# Patient Record
Sex: FEMALE | Race: BLACK OR AFRICAN AMERICAN | NOT HISPANIC OR LATINO | Employment: FULL TIME | URBAN - NONMETROPOLITAN AREA
[De-identification: names, ages, dates, MRNs, and addresses within clinical notes are randomized per-mention and may not be internally consistent; named-entity substitution may affect disease eponyms.]

---

## 2018-08-01 LAB — TSH SERPL DL<=0.05 MIU/L-ACNC: 1.73 UIU/ML (ref 0.27–4.2)

## 2020-09-26 ENCOUNTER — NURSE TRIAGE (OUTPATIENT)
Dept: CALL CENTER | Facility: HOSPITAL | Age: 35
End: 2020-09-26

## 2020-09-26 NOTE — TELEPHONE ENCOUNTER
Referred to urgent care for diagnosis.  Explained the implications of diagnosing an amoxil allergy and lifelong avoidance of that class of drug, versus if this is scarlet chula and just needs to finish the round of amoxil and will get better, versus a harmless amoxil rash. None of these can be diagnosed over the phone.     Reason for Disposition  • Taking new prescription antibiotic (Exception: finished taking new prescription antibiotic)    Additional Information  • Negative: [1] Life-threatening reaction (anaphylaxis) in the past to the same drug AND [2] < 2 hours since exposure  • Negative: Difficulty breathing or wheezing  • Negative: [1] Hoarseness or cough AND [2] started soon after 1st dose of drug  • Negative: [1] Swollen tongue AND [2] started soon after 1st dose of drug  • Negative: [1] Purple or blood-colored rash (spots or dots) AND [2] fever  • Negative: Sounds like a life-threatening emergency to the triager  • Negative: Rash is only on 1 part of the body (localized)  • Negative: Taking new non-prescription (OTC) antihistamine, decongestant, ear drops, eye drops, or other OTC cough/cold medicine  • Negative: Taking new prescription antihistamine, allergy medicine, asthma medicine, eye drops, ear drops or nose drops  • Negative: Rash started more than 3 days after stopping new prescription medicine  • Negative: Swollen tongue  • Negative: [1] Widespread hives AND [2] onset < 2 hours of exposure to 1st dose of drug  • Negative: Fever  • Negative: Patient sounds very sick or weak to the triager  • Negative: [1] Purple or blood-colored rash (spots or dots) AND [2] no fever AND [3] sounds well to triager  • Negative: [1] Taking new prescription medication AND [2] rash within 4 hours of 1st dose  • Negative: Large or small blisters on skin (i.e., fluid filled bubbles or sacs)  • Negative: Bloody crusts on lips or sores in mouth  • Negative: Face or lip swelling  • Negative: Hives or itching    Answer  "Assessment - Initial Assessment Questions  1. APPEARANCE of RASH: \"Describe the rash.\" (e.g., spots, blisters, raised areas, skin peeling, scaly)      Mosquito bite appearance, some cluster together more  2. SIZE: \"How big are the spots?\" (e.g., tip of pen, eraser, coin; inches, centimeters)      See above  3. LOCATION: \"Where is the rash located?\"      Arms  4. COLOR: \"What color is the rash?\" (Note: It is difficult to assess rash color in people with darker-colored skin. When this situation occurs, simply ask the caller to describe what they see.)      Pink  5. ONSET: \"When did the rash begin?\"      Today just began  6. FEVER: \"Do you have a fever?\" If so, ask: \"What is your temperature, how was it measured, and when did it start?\"      Afebrile  7. ITCHING: \"Does the rash itch?\" If so, ask: \"How bad is the itch?\" (Scale 1-10; or mild, moderate, severe)     Yes itching  8. CAUSE: \"What do you think is causing the rash?\"      Started amoxil 9/23  9. NEW MEDICATION: \"What new medication are you taking?\" (e.g., name of antibiotic) \"When did you start taking this medication?\".      amoxil  10. OTHER SYMPTOMS: \"Do you have any other symptoms?\" (e.g., sore throat, fever, joint pain)        For diagnosis strep throat.  Was see by provider, no throat swab done.    11. PREGNANCY: \"Is there any chance you are pregnant?\" \"When was your last menstrual period?\"        NA    Protocols used: RASH - WIDESPREAD ON DRUGS-ADULT-AH      "

## 2021-07-03 ENCOUNTER — HOSPITAL ENCOUNTER (OUTPATIENT)
Dept: GENERAL RADIOLOGY | Facility: HOSPITAL | Age: 36
Discharge: HOME | End: 2021-07-03
Admitting: NURSE PRACTITIONER

## 2021-07-03 PROCEDURE — 87635 SARS-COV-2 COVID-19 AMP PRB: CPT | Performed by: NURSE PRACTITIONER

## 2021-07-03 PROCEDURE — 71046 X-RAY EXAM CHEST 2 VIEWS: CPT

## 2021-07-06 ENCOUNTER — APPOINTMENT (OUTPATIENT)
Dept: GENERAL RADIOLOGY | Facility: HOSPITAL | Age: 36
End: 2021-07-06

## 2021-07-06 ENCOUNTER — NURSE TRIAGE (OUTPATIENT)
Dept: CALL CENTER | Facility: HOSPITAL | Age: 36
End: 2021-07-06

## 2021-07-06 ENCOUNTER — HOSPITAL ENCOUNTER (EMERGENCY)
Facility: HOSPITAL | Age: 36
Discharge: HOME | End: 2021-07-07
Attending: EMERGENCY MEDICINE | Admitting: EMERGENCY MEDICINE

## 2021-07-06 ENCOUNTER — APPOINTMENT (OUTPATIENT)
Dept: CT IMAGING | Facility: HOSPITAL | Age: 36
End: 2021-07-06

## 2021-07-06 DIAGNOSIS — U07.1 COVID-19: Primary | ICD-10-CM

## 2021-07-06 DIAGNOSIS — E86.0 DEHYDRATION: ICD-10-CM

## 2021-07-06 LAB
ALBUMIN SERPL-MCNC: 4.5 G/DL (ref 3.5–5.2)
ALBUMIN/GLOB SERPL: 1 G/DL
ALP SERPL-CCNC: 51 U/L (ref 39–117)
ALT SERPL W P-5'-P-CCNC: 34 U/L (ref 1–33)
ANION GAP SERPL CALCULATED.3IONS-SCNC: 15 MMOL/L (ref 5–15)
AST SERPL-CCNC: 32 U/L (ref 1–32)
B-HCG UR QL: NEGATIVE
BACTERIA UR QL AUTO: ABNORMAL /HPF
BASOPHILS # BLD AUTO: 0.03 10*3/MM3 (ref 0–0.2)
BASOPHILS NFR BLD AUTO: 0.4 % (ref 0–1.5)
BILIRUB SERPL-MCNC: 0.3 MG/DL (ref 0–1.2)
BILIRUB UR QL STRIP: NEGATIVE
BUN SERPL-MCNC: 14 MG/DL (ref 6–20)
BUN/CREAT SERPL: 9.5 (ref 7–25)
CALCIUM SPEC-SCNC: 9.8 MG/DL (ref 8.6–10.5)
CHLORIDE SERPL-SCNC: 99 MMOL/L (ref 98–107)
CLARITY UR: ABNORMAL
CO2 SERPL-SCNC: 20 MMOL/L (ref 22–29)
COLOR UR: YELLOW
CREAT SERPL-MCNC: 1.48 MG/DL (ref 0.57–1)
D DIMER PPP FEU-MCNC: 0.53 MG/L (FEU) (ref 0–0.5)
DEPRECATED RDW RBC AUTO: 40.1 FL (ref 37–54)
EOSINOPHIL # BLD AUTO: 0 10*3/MM3 (ref 0–0.4)
EOSINOPHIL NFR BLD AUTO: 0 % (ref 0.3–6.2)
ERYTHROCYTE [DISTWIDTH] IN BLOOD BY AUTOMATED COUNT: 14.2 % (ref 12.3–15.4)
GFR SERPL CREATININE-BSD FRML MDRD: 49 ML/MIN/1.73
GLOBULIN UR ELPH-MCNC: 4.6 GM/DL
GLUCOSE SERPL-MCNC: 90 MG/DL (ref 65–99)
GLUCOSE UR STRIP-MCNC: NEGATIVE MG/DL
HCT VFR BLD AUTO: 45.5 % (ref 34–46.6)
HGB BLD-MCNC: 16.1 G/DL (ref 12–15.9)
HGB UR QL STRIP.AUTO: ABNORMAL
HYALINE CASTS UR QL AUTO: ABNORMAL /LPF
IMM GRANULOCYTES # BLD AUTO: 0.03 10*3/MM3 (ref 0–0.05)
IMM GRANULOCYTES NFR BLD AUTO: 0.4 % (ref 0–0.5)
INTERNAL NEGATIVE CONTROL: NORMAL
INTERNAL POSITIVE CONTROL: NORMAL
KETONES UR QL STRIP: ABNORMAL
LEUKOCYTE ESTERASE UR QL STRIP.AUTO: NEGATIVE
LIPASE SERPL-CCNC: 57 U/L (ref 13–60)
LYMPHOCYTES # BLD AUTO: 1.83 10*3/MM3 (ref 0.7–3.1)
LYMPHOCYTES NFR BLD AUTO: 25.6 % (ref 19.6–45.3)
Lab: NORMAL
MCH RBC QN AUTO: 27.8 PG (ref 26.6–33)
MCHC RBC AUTO-ENTMCNC: 35.4 G/DL (ref 31.5–35.7)
MCV RBC AUTO: 78.6 FL (ref 79–97)
MONOCYTES # BLD AUTO: 0.5 10*3/MM3 (ref 0.1–0.9)
MONOCYTES NFR BLD AUTO: 7 % (ref 5–12)
NEUTROPHILS NFR BLD AUTO: 4.76 10*3/MM3 (ref 1.7–7)
NEUTROPHILS NFR BLD AUTO: 66.6 % (ref 42.7–76)
NITRITE UR QL STRIP: NEGATIVE
NRBC BLD AUTO-RTO: 0 /100 WBC (ref 0–0.2)
PH UR STRIP.AUTO: 6.5 [PH] (ref 5–8)
PLATELET # BLD AUTO: 249 10*3/MM3 (ref 140–450)
PMV BLD AUTO: 9.2 FL (ref 6–12)
POTASSIUM SERPL-SCNC: 3.5 MMOL/L (ref 3.5–5.2)
PROT SERPL-MCNC: 9.1 G/DL (ref 6–8.5)
PROT UR QL STRIP: ABNORMAL
RBC # BLD AUTO: 5.79 10*6/MM3 (ref 3.77–5.28)
RBC # UR: ABNORMAL /HPF
REF LAB TEST METHOD: ABNORMAL
SODIUM SERPL-SCNC: 134 MMOL/L (ref 136–145)
SP GR UR STRIP: 1.01 (ref 1–1.03)
SQUAMOUS #/AREA URNS HPF: ABNORMAL /HPF
UROBILINOGEN UR QL STRIP: ABNORMAL
WBC # BLD AUTO: 7.15 10*3/MM3 (ref 3.4–10.8)
WBC UR QL AUTO: ABNORMAL /HPF

## 2021-07-06 PROCEDURE — 96374 THER/PROPH/DIAG INJ IV PUSH: CPT

## 2021-07-06 PROCEDURE — 87040 BLOOD CULTURE FOR BACTERIA: CPT | Performed by: EMERGENCY MEDICINE

## 2021-07-06 PROCEDURE — 93010 ELECTROCARDIOGRAM REPORT: CPT | Performed by: INTERNAL MEDICINE

## 2021-07-06 PROCEDURE — 85379 FIBRIN DEGRADATION QUANT: CPT | Performed by: EMERGENCY MEDICINE

## 2021-07-06 PROCEDURE — 81025 URINE PREGNANCY TEST: CPT | Performed by: EMERGENCY MEDICINE

## 2021-07-06 PROCEDURE — 85025 COMPLETE CBC W/AUTO DIFF WBC: CPT | Performed by: EMERGENCY MEDICINE

## 2021-07-06 PROCEDURE — 71045 X-RAY EXAM CHEST 1 VIEW: CPT

## 2021-07-06 PROCEDURE — 81001 URINALYSIS AUTO W/SCOPE: CPT | Performed by: EMERGENCY MEDICINE

## 2021-07-06 PROCEDURE — 83690 ASSAY OF LIPASE: CPT | Performed by: EMERGENCY MEDICINE

## 2021-07-06 PROCEDURE — 71275 CT ANGIOGRAPHY CHEST: CPT

## 2021-07-06 PROCEDURE — 99284 EMERGENCY DEPT VISIT MOD MDM: CPT

## 2021-07-06 PROCEDURE — 25010000002 DEXAMETHASONE PER 1 MG: Performed by: EMERGENCY MEDICINE

## 2021-07-06 PROCEDURE — 25010000002 ONDANSETRON PER 1 MG: Performed by: EMERGENCY MEDICINE

## 2021-07-06 PROCEDURE — 93005 ELECTROCARDIOGRAM TRACING: CPT | Performed by: EMERGENCY MEDICINE

## 2021-07-06 PROCEDURE — 96375 TX/PRO/DX INJ NEW DRUG ADDON: CPT

## 2021-07-06 PROCEDURE — 0 IOPAMIDOL PER 1 ML: Performed by: EMERGENCY MEDICINE

## 2021-07-06 PROCEDURE — 80053 COMPREHEN METABOLIC PANEL: CPT | Performed by: EMERGENCY MEDICINE

## 2021-07-06 RX ORDER — DEXAMETHASONE SODIUM PHOSPHATE 10 MG/ML
10 INJECTION INTRAMUSCULAR; INTRAVENOUS ONCE
Status: COMPLETED | OUTPATIENT
Start: 2021-07-06 | End: 2021-07-06

## 2021-07-06 RX ORDER — ONDANSETRON 2 MG/ML
4 INJECTION INTRAMUSCULAR; INTRAVENOUS ONCE
Status: COMPLETED | OUTPATIENT
Start: 2021-07-06 | End: 2021-07-06

## 2021-07-06 RX ADMIN — DEXAMETHASONE SODIUM PHOSPHATE 10 MG: 10 INJECTION INTRAMUSCULAR; INTRAVENOUS at 20:12

## 2021-07-06 RX ADMIN — ONDANSETRON 4 MG: 2 INJECTION INTRAMUSCULAR; INTRAVENOUS at 20:11

## 2021-07-06 RX ADMIN — SODIUM CHLORIDE, POTASSIUM CHLORIDE, SODIUM LACTATE AND CALCIUM CHLORIDE 1000 ML: 600; 310; 30; 20 INJECTION, SOLUTION INTRAVENOUS at 20:09

## 2021-07-06 RX ADMIN — IOPAMIDOL 72 ML: 755 INJECTION, SOLUTION INTRAVENOUS at 23:15

## 2021-07-06 NOTE — TELEPHONE ENCOUNTER
States she found out Saturday that she tested positive for covid. States she has been eating soup but she is nauseated and vomiting. States she feels bad.     Denies any shortness of breath. Last urinated in the last few minutes. States she has had lightheadedness. States she has had some diarrhea.     Encouraged to call PCP for nausea medication this morning.     Reason for Disposition  • [1] COVID-19 diagnosed AND [2] has mild nausea, vomiting or diarrhea    Additional Information  • Negative: SEVERE difficulty breathing (e.g., struggling for each breath, speaks in single words)  • Negative: Difficult to awaken or acting confused (e.g., disoriented, slurred speech)  • Negative: Bluish (or gray) lips or face now  • Negative: Shock suspected (e.g., cold/pale/clammy skin, too weak to stand, low BP, rapid pulse)  • Negative: Sounds like a life-threatening emergency to the triager  • Negative: [1] COVID-19 exposure AND [2] has not completed COVID-19 vaccine series AND [3] no symptoms  • Negative: [1] COVID-19 exposure AND [2] completed COVID-19 vaccine series (fully vaccinated) AND [3] no symptoms  • Negative: COVID-19 vaccine reaction suspected (e.g., fever, headache, muscle aches) occurring during days 1-3 after getting vaccine  • Negative: COVID-19 vaccine, questions about  • Negative: [1] COVID-19 vaccine series completed (fully vaccinated) AND [2] new-onset of COVID-19 symptoms BUT [3] no known exposure  • Negative: [1] Had lab test confirmed COVID-19 infection within last 3 months AND [2] new-onset of COVID-19 symptoms BUT [3] no known exposure  • Negative: [1] Lives with someone known to have influenza (flu test positive) AND [2] flu-like symptoms (e.g., cough, runny nose, sore throat, SOB; with or without fever)  • Negative: [1] Adult with possible COVID-19 symptoms AND [2] triager concerned about severity of symptoms or other causes  • Negative: COVID-19 and breastfeeding, questions about  • Negative: SEVERE  or constant chest pain or pressure (Exception: mild central chest pain, present only when coughing)  • Negative: MODERATE difficulty breathing (e.g., speaks in phrases, SOB even at rest, pulse 100-120)  • Negative: [1] Headache AND [2] stiff neck (can't touch chin to chest)  • Negative: MILD difficulty breathing (e.g., minimal/no SOB at rest, SOB with walking, pulse <100)  • Negative: Chest pain or pressure  • Negative: Patient sounds very sick or weak to the triager  • Negative: Fever > 103 F (39.4 C)  • Negative: [1] Fever > 101 F (38.3 C) AND [2] age > 60 years  • Negative: [1] Fever > 100.0 F (37.8 C) AND [2] bedridden (e.g., nursing home patient, CVA, chronic illness, recovering from surgery)  • Negative: [1] HIGH RISK patient (e.g., age > 64 years, diabetes, heart or lung disease, weak immune system) AND [2] new or worsening symptoms  • Negative: [1] HIGH RISK patient AND [2] influenza is widespread in the community AND [3] ONE OR MORE respiratory symptoms: cough, sore throat, runny or stuffy nose  • Negative: [1] HIGH RISK patient AND [2] influenza exposure within the last 7 days AND [3] ONE OR MORE respiratory symptoms: cough, sore throat, runny or stuffy nose  • Negative: Fever present > 3 days (72 hours)  • Negative: [1] Fever returns after gone for over 24 hours AND [2] symptoms worse or not improved  • Negative: [1] Continuous (nonstop) coughing interferes with work or school AND [2] no improvement using cough treatment per protocol  • Negative: [1] COVID-19 infection suspected by caller or triager AND [2] mild symptoms (cough, fever, or others) AND [3] no complications or SOB  • Negative: Cough present > 3 weeks  • Negative: [1] COVID-19 diagnosed by positive lab test AND [2] NO symptoms (e.g., cough, fever, others)  • Negative: [1] COVID-19 diagnosed by positive lab test AND [2] mild symptoms (e.g., cough, fever, others) AND [3] no complications or SOB  • Negative: [1] COVID-19 diagnosed by HCP  "(doctor, NP or PA) AND [2] mild symptoms (e.g., cough, fever, others) AND [3] no complications or SOB    Answer Assessment - Initial Assessment Questions  1. COVID-19 DIAGNOSIS: \"Who made your Coronavirus (COVID-19) diagnosis?\" \"Was it confirmed by a positive lab test?\" If not diagnosed by a HCP, ask \"Are there lots of cases (community spread) where you live?\" (See public health department website, if unsure)      See note  2. COVID-19 EXPOSURE: \"Was there any known exposure to COVID before the symptoms began?\" CDC Definition of close contact: within 6 feet (2 meters) for a total of 15 minutes or more over a 24-hour period.       See note  3. ONSET: \"When did the COVID-19 symptoms start?\"       See note  4. WORST SYMPTOM: \"What is your worst symptom?\" (e.g., cough, fever, shortness of breath, muscle aches)      See note  5. COUGH: \"Do you have a cough?\" If Yes, ask: \"How bad is the cough?\"        See note  6. FEVER: \"Do you have a fever?\" If Yes, ask: \"What is your temperature, how was it measured, and when did it start?\"      See note  7. RESPIRATORY STATUS: \"Describe your breathing?\" (e.g., shortness of breath, wheezing, unable to speak)       See note  8. BETTER-SAME-WORSE: \"Are you getting better, staying the same or getting worse compared to yesterday?\"  If getting worse, ask, \"In what way?\"      See note  9. HIGH RISK DISEASE: \"Do you have any chronic medical problems?\" (e.g., asthma, heart or lung disease, weak immune system, obesity, etc.)      See note  10. PREGNANCY: \"Is there any chance you are pregnant?\" \"When was your last menstrual period?\"        See note  11. OTHER SYMPTOMS: \"Do you have any other symptoms?\"  (e.g., chills, fatigue, headache, loss of smell or taste, muscle pain, sore throat; new loss of smell or taste especially support the diagnosis of COVID-19)        See note    Protocols used: CORONAVIRUS (COVID-19) DIAGNOSED OR SUSPECTED-ADULT-AH      "

## 2021-07-07 VITALS
SYSTOLIC BLOOD PRESSURE: 109 MMHG | HEART RATE: 102 BPM | HEIGHT: 60 IN | WEIGHT: 158 LBS | DIASTOLIC BLOOD PRESSURE: 84 MMHG | BODY MASS INDEX: 31.02 KG/M2 | RESPIRATION RATE: 19 BRPM | OXYGEN SATURATION: 100 % | TEMPERATURE: 98.9 F

## 2021-07-07 PROCEDURE — 25010000002 ONDANSETRON PER 1 MG: Performed by: EMERGENCY MEDICINE

## 2021-07-07 PROCEDURE — 96376 TX/PRO/DX INJ SAME DRUG ADON: CPT

## 2021-07-07 RX ORDER — PROMETHAZINE HYDROCHLORIDE 25 MG/1
25 TABLET ORAL EVERY 6 HOURS PRN
Qty: 15 TABLET | Refills: 0 | Status: SHIPPED | OUTPATIENT
Start: 2021-07-07 | End: 2021-07-07 | Stop reason: SDUPTHER

## 2021-07-07 RX ORDER — DEXAMETHASONE 1 MG
1 TABLET ORAL DAILY
Qty: 6 TABLET | Refills: 0 | Status: ON HOLD | OUTPATIENT
Start: 2021-07-07 | End: 2021-07-12

## 2021-07-07 RX ORDER — DEXAMETHASONE 1 MG
1 TABLET ORAL DAILY
Qty: 6 TABLET | Refills: 0 | Status: SHIPPED | OUTPATIENT
Start: 2021-07-07 | End: 2021-07-07 | Stop reason: SDUPTHER

## 2021-07-07 RX ORDER — PROMETHAZINE HYDROCHLORIDE 25 MG/1
25 TABLET ORAL EVERY 6 HOURS PRN
Qty: 15 TABLET | Refills: 0 | Status: SHIPPED | OUTPATIENT
Start: 2021-07-07

## 2021-07-07 RX ORDER — BENZONATATE 200 MG/1
200 CAPSULE ORAL 3 TIMES DAILY PRN
Qty: 8 CAPSULE | Refills: 0 | Status: ON HOLD | OUTPATIENT
Start: 2021-07-07 | End: 2021-07-15 | Stop reason: SDUPTHER

## 2021-07-07 RX ORDER — ONDANSETRON 2 MG/ML
4 INJECTION INTRAMUSCULAR; INTRAVENOUS ONCE
Status: COMPLETED | OUTPATIENT
Start: 2021-07-07 | End: 2021-07-07

## 2021-07-07 RX ORDER — BENZONATATE 200 MG/1
200 CAPSULE ORAL 3 TIMES DAILY PRN
Qty: 8 CAPSULE | Refills: 0 | Status: SHIPPED | OUTPATIENT
Start: 2021-07-07 | End: 2021-07-07 | Stop reason: SDUPTHER

## 2021-07-07 RX ADMIN — ONDANSETRON 4 MG: 2 INJECTION INTRAMUSCULAR; INTRAVENOUS at 00:34

## 2021-07-07 NOTE — ED PROVIDER NOTES
"Subjective   36 y/o female diagnosed with COVID on 7/3 arrives now for \"feeling bad\" She notes nausea, vomiting, cough, fevers and body aches. She is taking azithromycin but is not taking any other medications. She denies any pain, provoking/alleviating factors. She is not vaccinated against COVID. She denies falls, trauma, flank or back pain, dysuria, hematuria or other issues. She arrives in NAD.           Review of Systems   All other systems reviewed and are negative.      No past medical history on file.    No Known Allergies    Past Surgical History:   Procedure Laterality Date   •  SECTION         No family history on file.    Social History     Socioeconomic History   • Marital status: Single     Spouse name: Not on file   • Number of children: Not on file   • Years of education: Not on file   • Highest education level: Not on file   Tobacco Use   • Smoking status: Former Smoker     Packs/day: 0.25     Types: Cigarettes   • Smokeless tobacco: Never Used   Substance and Sexual Activity   • Alcohol use: Yes     Comment: one glass   • Drug use: Never           Objective   Physical Exam  Vitals and nursing note reviewed.   Constitutional:       Appearance: Normal appearance. She is normal weight.   HENT:      Head: Normocephalic and atraumatic.      Right Ear: External ear normal.      Left Ear: External ear normal.      Nose: Nose normal.      Mouth/Throat:      Mouth: Mucous membranes are moist.      Pharynx: Oropharynx is clear.   Eyes:      Conjunctiva/sclera: Conjunctivae normal.      Pupils: Pupils are equal, round, and reactive to light.   Cardiovascular:      Rate and Rhythm: Regular rhythm. Tachycardia present.      Pulses: Normal pulses.      Heart sounds: Normal heart sounds.   Pulmonary:      Effort: Pulmonary effort is normal. No respiratory distress.      Breath sounds: Normal breath sounds. No stridor. No wheezing or rhonchi.   Abdominal:      General: Abdomen is flat. Bowel sounds are " normal. There is no distension.      Palpations: There is no mass.      Tenderness: There is no abdominal tenderness.   Musculoskeletal:         General: No swelling or tenderness. Normal range of motion.      Cervical back: Normal range of motion.   Skin:     General: Skin is warm.      Capillary Refill: Capillary refill takes less than 2 seconds.   Neurological:      General: No focal deficit present.      Mental Status: She is alert and oriented to person, place, and time. Mental status is at baseline.      Cranial Nerves: No cranial nerve deficit.   Psychiatric:         Mood and Affect: Mood normal.         Behavior: Behavior normal.         Thought Content: Thought content normal.         Procedures           ED Course  ED Course as of Jul 07 0017 Wed Jul 07, 2021   0012 Her CTA was without acute findings.     I explained to the patient that her studies looked okay. Maybe a mild dehydration but otherwise no acute findings. No further vomiting here. She does not meet criteria for admission nor for MAB given normal vs and young age without risk factors. At this time we will dc to home.     [JH]      ED Course User Index  [JH] Anderson Granda MD            XR Chest 1 View   Final Result   1. No acute cardiopulmonary findings.   This report was finalized on 07/06/2021 20:01 by Dr Pebbles Sprague MD.      CT Angiogram Chest    (Results Pending)     Labs Reviewed   COMPREHENSIVE METABOLIC PANEL - Abnormal; Notable for the following components:       Result Value    Creatinine 1.48 (*)     Sodium 134 (*)     CO2 20.0 (*)     Total Protein 9.1 (*)     ALT (SGPT) 34 (*)     eGFR   Amer 49 (*)     All other components within normal limits    Narrative:     GFR Normal >60  Chronic Kidney Disease <60  Kidney Failure <15     D-DIMER, QUANTITATIVE - Abnormal; Notable for the following components:    D-Dimer, Quantitative 0.53 (*)     All other components within normal limits    Narrative:     Reference Range  is 0-0.50 mg/L FEU. However, results <0.50 mg/L FEU tends to rule out DVT or PE. Results >0.50 mg/L FEU are not useful in predicting absence or presence of DVT or PE.     URINALYSIS W/ CULTURE IF INDICATED - Abnormal; Notable for the following components:    Appearance, UA Cloudy (*)     Ketones, UA Trace (*)     Blood, UA Moderate (2+) (*)     Protein, UA Trace (*)     All other components within normal limits   CBC WITH AUTO DIFFERENTIAL - Abnormal; Notable for the following components:    RBC 5.79 (*)     Hemoglobin 16.1 (*)     MCV 78.6 (*)     Eosinophil % 0.0 (*)     All other components within normal limits   URINALYSIS, MICROSCOPIC ONLY - Abnormal; Notable for the following components:    RBC, UA 6-12 (*)     WBC, UA 3-5 (*)     Bacteria, UA 1+ (*)     Squamous Epithelial Cells, UA 7-12 (*)     All other components within normal limits   LIPASE - Normal   POCT PEFORM URINE PREGNANCY - Normal   BLOOD CULTURE   BLOOD CULTURE   CBC AND DIFFERENTIAL    Narrative:     The following orders were created for panel order CBC & Differential.  Procedure                               Abnormality         Status                     ---------                               -----------         ------                     CBC Auto Differential[[id number]]        Abnormal            Final result                 Please view results for these tests on the individual orders.                                       MDM    Final diagnoses:   COVID-19   Dehydration       ED Disposition  ED Disposition     ED Disposition Condition Comment    Discharge Stable           Omar Muhammad MD  [address] [place] [address] [address]  [facility] [address] [phone]               Medication List      New Prescriptions    benzonatate 200 MG capsule  Commonly known as: TESSALON  Take 1 capsule by mouth 3 (Three) Times a Day As Needed for Cough.     dexamethasone 1 MG tablet  Commonly known as: DECADRON  Take 1 tablet by mouth Daily.     promethazine 25  MG tablet  Commonly known as: PHENERGAN  Take 1 tablet by mouth Every 6 (Six) Hours As Needed for Nausea or Vomiting.           Where to Get Your Medications      These medications were sent to [facility]/pharmacy #[id number] - [place], [place] - [address] AT [address]CROSS FROM [address] - [phone] [facility] [phone]   [address] [place] [address]    Hours: 24-hours Phone: [phone]   · benzonatate 200 MG capsule  · dexamethasone 1 MG tablet  · promethazine 25 MG tablet          Anderson Granda MD  07/07/21 0017       Anderson Granda MD  07/07/21 0017       Anderson Granda MD  07/07/21 0017

## 2021-07-08 LAB
QT INTERVAL: 332 MS
QTC INTERVAL: 469 MS

## 2021-07-09 ENCOUNTER — PATIENT OUTREACH (OUTPATIENT)
Dept: CASE MANAGEMENT | Facility: OTHER | Age: 36
End: 2021-07-09

## 2021-07-09 NOTE — OUTREACH NOTE
Ambulatory Case Management Note    ED Potential Covid Discharge Follow-up    Patient seen at [facility] ED 7.6.21 for COVID-19 and dehydration. She is consuming fluids but her appetite is minimal;smell and taste remain intact. Denied SOB, N/V, body aches, or fever. She will contact PCP for any further needs. She filled the Decadron prescribed in ED. MyChart is active, discussed 24/7 nurse triage, and ACM contact information. She denied food or medication insecurities. Family/friends are assisting with supplies. She understands her 10 day quarantine.     Anita Adam RN  Ambulatory Case Management    7/9/2021, 11:32 CDT

## 2021-07-11 ENCOUNTER — HOSPITAL ENCOUNTER (INPATIENT)
Facility: HOSPITAL | Age: 36
LOS: 4 days | Discharge: HOME | End: 2021-07-15
Attending: EMERGENCY MEDICINE | Admitting: INTERNAL MEDICINE

## 2021-07-11 ENCOUNTER — APPOINTMENT (OUTPATIENT)
Dept: GENERAL RADIOLOGY | Facility: HOSPITAL | Age: 36
End: 2021-07-11

## 2021-07-11 ENCOUNTER — NURSE TRIAGE (OUTPATIENT)
Dept: CALL CENTER | Facility: HOSPITAL | Age: 36
End: 2021-07-11

## 2021-07-11 DIAGNOSIS — R65.10 SYSTEMIC INFLAMMATORY RESPONSE SYNDROME (HCC): Primary | ICD-10-CM

## 2021-07-11 DIAGNOSIS — U07.1 COVID-19: ICD-10-CM

## 2021-07-11 DIAGNOSIS — N28.9 RENAL INSUFFICIENCY: ICD-10-CM

## 2021-07-11 DIAGNOSIS — E87.29 HIGH ANION GAP METABOLIC ACIDOSIS: ICD-10-CM

## 2021-07-11 PROBLEM — J12.82 PNEUMONIA DUE TO COVID-19 VIRUS: Status: ACTIVE | Noted: 2021-07-11

## 2021-07-11 PROBLEM — R11.2 NAUSEA & VOMITING: Status: ACTIVE | Noted: 2021-07-11

## 2021-07-11 LAB
ABO GROUP BLD: NORMAL
ALBUMIN SERPL-MCNC: 4.2 G/DL (ref 3.5–5.2)
ALBUMIN/GLOB SERPL: 1.1 G/DL
ALP SERPL-CCNC: 37 U/L (ref 39–117)
ALT SERPL W P-5'-P-CCNC: 22 U/L (ref 1–33)
ANION GAP SERPL CALCULATED.3IONS-SCNC: 18 MMOL/L (ref 5–15)
ARTERIAL PATENCY WRIST A: POSITIVE
AST SERPL-CCNC: 32 U/L (ref 1–32)
ATMOSPHERIC PRESS: 746 MMHG
B PARAPERT DNA SPEC QL NAA+PROBE: NOT DETECTED
B PERT DNA SPEC QL NAA+PROBE: NOT DETECTED
BACTERIA SPEC AEROBE CULT: NORMAL
BACTERIA SPEC AEROBE CULT: NORMAL
BACTERIA UR QL AUTO: ABNORMAL /HPF
BASE EXCESS BLDA CALC-SCNC: -2.7 MMOL/L (ref 0–2)
BASOPHILS # BLD AUTO: 0.04 10*3/MM3 (ref 0–0.2)
BASOPHILS NFR BLD AUTO: 0.4 % (ref 0–1.5)
BDY SITE: ABNORMAL
BILIRUB SERPL-MCNC: 0.5 MG/DL (ref 0–1.2)
BILIRUB UR QL STRIP: NEGATIVE
BLD GP AB SCN SERPL QL: NEGATIVE
BODY TEMPERATURE: 37 C
BUN SERPL-MCNC: 19 MG/DL (ref 6–20)
BUN/CREAT SERPL: 15 (ref 7–25)
C PNEUM DNA NPH QL NAA+NON-PROBE: NOT DETECTED
CALCIUM SPEC-SCNC: 9 MG/DL (ref 8.6–10.5)
CHLORIDE SERPL-SCNC: 94 MMOL/L (ref 98–107)
CK SERPL-CCNC: 223 U/L (ref 20–180)
CLARITY UR: CLEAR
CO2 SERPL-SCNC: 17 MMOL/L (ref 22–29)
COLOR UR: YELLOW
CREAT SERPL-MCNC: 1.27 MG/DL (ref 0.57–1)
CRP SERPL-MCNC: 10.26 MG/DL (ref 0–0.5)
D DIMER PPP FEU-MCNC: 0.53 MG/L (FEU) (ref 0–0.5)
DEPRECATED RDW RBC AUTO: 37.4 FL (ref 37–54)
EOSINOPHIL # BLD AUTO: 0 10*3/MM3 (ref 0–0.4)
EOSINOPHIL NFR BLD AUTO: 0 % (ref 0.3–6.2)
ERYTHROCYTE [DISTWIDTH] IN BLOOD BY AUTOMATED COUNT: 13.7 % (ref 12.3–15.4)
FERRITIN SERPL-MCNC: 1450 NG/ML (ref 13–150)
FLUAV SUBTYP SPEC NAA+PROBE: NOT DETECTED
FLUBV RNA ISLT QL NAA+PROBE: NOT DETECTED
GFR SERPL CREATININE-BSD FRML MDRD: 58 ML/MIN/1.73
GLOBULIN UR ELPH-MCNC: 3.9 GM/DL
GLUCOSE SERPL-MCNC: 87 MG/DL (ref 65–99)
GLUCOSE UR STRIP-MCNC: NEGATIVE MG/DL
HADV DNA SPEC NAA+PROBE: NOT DETECTED
HCO3 BLDA-SCNC: 17.4 MMOL/L (ref 20–26)
HCOV 229E RNA SPEC QL NAA+PROBE: NOT DETECTED
HCOV HKU1 RNA SPEC QL NAA+PROBE: NOT DETECTED
HCOV NL63 RNA SPEC QL NAA+PROBE: NOT DETECTED
HCOV OC43 RNA SPEC QL NAA+PROBE: NOT DETECTED
HCT VFR BLD AUTO: 41.9 % (ref 34–46.6)
HGB BLD-MCNC: 15.3 G/DL (ref 12–15.9)
HGB UR QL STRIP.AUTO: ABNORMAL
HMPV RNA NPH QL NAA+NON-PROBE: NOT DETECTED
HPIV1 RNA SPEC QL NAA+PROBE: NOT DETECTED
HPIV2 RNA SPEC QL NAA+PROBE: NOT DETECTED
HPIV3 RNA NPH QL NAA+PROBE: NOT DETECTED
HPIV4 P GENE NPH QL NAA+PROBE: NOT DETECTED
HYALINE CASTS UR QL AUTO: ABNORMAL /LPF
IMM GRANULOCYTES # BLD AUTO: 0.14 10*3/MM3 (ref 0–0.05)
IMM GRANULOCYTES NFR BLD AUTO: 1.4 % (ref 0–0.5)
KETONES UR QL STRIP: NEGATIVE
L PNEUMO1 AG UR QL IA: NEGATIVE
LDH SERPL-CCNC: 384 U/L (ref 135–214)
LEUKOCYTE ESTERASE UR QL STRIP.AUTO: NEGATIVE
LIPASE SERPL-CCNC: 62 U/L (ref 13–60)
LYMPHOCYTES # BLD AUTO: 1 10*3/MM3 (ref 0.7–3.1)
LYMPHOCYTES NFR BLD AUTO: 10.2 % (ref 19.6–45.3)
Lab: ABNORMAL
M PNEUMO IGG SER IA-ACNC: NOT DETECTED
MAGNESIUM SERPL-MCNC: 2.2 MG/DL (ref 1.6–2.6)
MCH RBC QN AUTO: 27.8 PG (ref 26.6–33)
MCHC RBC AUTO-ENTMCNC: 36.5 G/DL (ref 31.5–35.7)
MCV RBC AUTO: 76.2 FL (ref 79–97)
MODALITY: ABNORMAL
MONOCYTES # BLD AUTO: 0.51 10*3/MM3 (ref 0.1–0.9)
MONOCYTES NFR BLD AUTO: 5.2 % (ref 5–12)
NEUTROPHILS NFR BLD AUTO: 8.11 10*3/MM3 (ref 1.7–7)
NEUTROPHILS NFR BLD AUTO: 82.8 % (ref 42.7–76)
NITRITE UR QL STRIP: NEGATIVE
NRBC BLD AUTO-RTO: 0 /100 WBC (ref 0–0.2)
NT-PROBNP SERPL-MCNC: <5 PG/ML (ref 0–450)
PCO2 BLDA: 20.4 MM HG (ref 35–45)
PCO2 TEMP ADJ BLD: 20.4 MM HG (ref 35–45)
PH BLDA: 7.54 PH UNITS (ref 7.35–7.45)
PH UR STRIP.AUTO: 7 [PH] (ref 5–8)
PH, TEMP CORRECTED: 7.54 PH UNITS (ref 7.35–7.45)
PHOSPHATE SERPL-MCNC: 2 MG/DL (ref 2.5–4.5)
PLATELET # BLD AUTO: 291 10*3/MM3 (ref 140–450)
PMV BLD AUTO: 9.7 FL (ref 6–12)
PO2 BLDA: 67.9 MM HG (ref 83–108)
PO2 TEMP ADJ BLD: 67.9 MM HG (ref 83–108)
POTASSIUM SERPL-SCNC: 3.2 MMOL/L (ref 3.5–5.2)
PROCALCITONIN SERPL-MCNC: 0.16 NG/ML (ref 0–0.25)
PROT SERPL-MCNC: 8.1 G/DL (ref 6–8.5)
PROT UR QL STRIP: NEGATIVE
QT INTERVAL: 356 MS
QTC INTERVAL: 537 MS
RBC # BLD AUTO: 5.5 10*6/MM3 (ref 3.77–5.28)
RBC # UR: ABNORMAL /HPF
REF LAB TEST METHOD: ABNORMAL
RH BLD: POSITIVE
RHINOVIRUS RNA SPEC NAA+PROBE: NOT DETECTED
RSV RNA NPH QL NAA+NON-PROBE: NOT DETECTED
S PNEUM AG SPEC QL LA: NEGATIVE
SAO2 % BLDCOA: 95.7 % (ref 94–99)
SODIUM SERPL-SCNC: 129 MMOL/L (ref 136–145)
SP GR UR STRIP: 1.01 (ref 1–1.03)
SQUAMOUS #/AREA URNS HPF: ABNORMAL /HPF
T&S EXPIRATION DATE: NORMAL
TROPONIN T SERPL-MCNC: <0.01 NG/ML (ref 0–0.03)
UROBILINOGEN UR QL STRIP: ABNORMAL
VENTILATOR MODE: ABNORMAL
WBC # BLD AUTO: 9.8 10*3/MM3 (ref 3.4–10.8)
WBC UR QL AUTO: ABNORMAL /HPF

## 2021-07-11 PROCEDURE — 25010000002 ONDANSETRON PER 1 MG: Performed by: FAMILY MEDICINE

## 2021-07-11 PROCEDURE — 71045 X-RAY EXAM CHEST 1 VIEW: CPT

## 2021-07-11 PROCEDURE — 82728 ASSAY OF FERRITIN: CPT | Performed by: FAMILY MEDICINE

## 2021-07-11 PROCEDURE — XW033E5 INTRODUCTION OF REMDESIVIR ANTI-INFECTIVE INTO PERIPHERAL VEIN, PERCUTANEOUS APPROACH, NEW TECHNOLOGY GROUP 5: ICD-10-PCS | Performed by: INTERNAL MEDICINE

## 2021-07-11 PROCEDURE — 86140 C-REACTIVE PROTEIN: CPT | Performed by: EMERGENCY MEDICINE

## 2021-07-11 PROCEDURE — 93010 ELECTROCARDIOGRAM REPORT: CPT | Performed by: INTERNAL MEDICINE

## 2021-07-11 PROCEDURE — 84145 PROCALCITONIN (PCT): CPT | Performed by: FAMILY MEDICINE

## 2021-07-11 PROCEDURE — 85379 FIBRIN DEGRADATION QUANT: CPT | Performed by: EMERGENCY MEDICINE

## 2021-07-11 PROCEDURE — 83690 ASSAY OF LIPASE: CPT | Performed by: EMERGENCY MEDICINE

## 2021-07-11 PROCEDURE — 84484 ASSAY OF TROPONIN QUANT: CPT | Performed by: EMERGENCY MEDICINE

## 2021-07-11 PROCEDURE — 80053 COMPREHEN METABOLIC PANEL: CPT | Performed by: EMERGENCY MEDICINE

## 2021-07-11 PROCEDURE — 82803 BLOOD GASES ANY COMBINATION: CPT

## 2021-07-11 PROCEDURE — 86900 BLOOD TYPING SEROLOGIC ABO: CPT | Performed by: EMERGENCY MEDICINE

## 2021-07-11 PROCEDURE — 84100 ASSAY OF PHOSPHORUS: CPT | Performed by: EMERGENCY MEDICINE

## 2021-07-11 PROCEDURE — 87040 BLOOD CULTURE FOR BACTERIA: CPT | Performed by: FAMILY MEDICINE

## 2021-07-11 PROCEDURE — 36600 WITHDRAWAL OF ARTERIAL BLOOD: CPT

## 2021-07-11 PROCEDURE — 87899 AGENT NOS ASSAY W/OPTIC: CPT | Performed by: FAMILY MEDICINE

## 2021-07-11 PROCEDURE — 99284 EMERGENCY DEPT VISIT MOD MDM: CPT

## 2021-07-11 PROCEDURE — 93005 ELECTROCARDIOGRAM TRACING: CPT

## 2021-07-11 PROCEDURE — 81001 URINALYSIS AUTO W/SCOPE: CPT | Performed by: EMERGENCY MEDICINE

## 2021-07-11 PROCEDURE — 82550 ASSAY OF CK (CPK): CPT | Performed by: EMERGENCY MEDICINE

## 2021-07-11 PROCEDURE — 83735 ASSAY OF MAGNESIUM: CPT | Performed by: EMERGENCY MEDICINE

## 2021-07-11 PROCEDURE — 87633 RESP VIRUS 12-25 TARGETS: CPT | Performed by: FAMILY MEDICINE

## 2021-07-11 PROCEDURE — 86850 RBC ANTIBODY SCREEN: CPT | Performed by: EMERGENCY MEDICINE

## 2021-07-11 PROCEDURE — 83615 LACTATE (LD) (LDH) ENZYME: CPT | Performed by: FAMILY MEDICINE

## 2021-07-11 PROCEDURE — 25010000002 ENOXAPARIN PER 10 MG: Performed by: FAMILY MEDICINE

## 2021-07-11 PROCEDURE — 86901 BLOOD TYPING SEROLOGIC RH(D): CPT | Performed by: EMERGENCY MEDICINE

## 2021-07-11 PROCEDURE — 85025 COMPLETE CBC W/AUTO DIFF WBC: CPT | Performed by: EMERGENCY MEDICINE

## 2021-07-11 PROCEDURE — 83880 ASSAY OF NATRIURETIC PEPTIDE: CPT | Performed by: EMERGENCY MEDICINE

## 2021-07-11 RX ORDER — FAMOTIDINE 10 MG/ML
20 INJECTION, SOLUTION INTRAVENOUS 2 TIMES DAILY
Status: DISCONTINUED | OUTPATIENT
Start: 2021-07-11 | End: 2021-07-13

## 2021-07-11 RX ORDER — ACETAMINOPHEN 325 MG/1
650 TABLET ORAL EVERY 4 HOURS PRN
Status: DISCONTINUED | OUTPATIENT
Start: 2021-07-11 | End: 2021-07-15 | Stop reason: HOSPADM

## 2021-07-11 RX ORDER — SODIUM CHLORIDE, SODIUM LACTATE, POTASSIUM CHLORIDE, CALCIUM CHLORIDE 600; 310; 30; 20 MG/100ML; MG/100ML; MG/100ML; MG/100ML
75 INJECTION, SOLUTION INTRAVENOUS CONTINUOUS
Status: DISCONTINUED | OUTPATIENT
Start: 2021-07-11 | End: 2021-07-12

## 2021-07-11 RX ORDER — BENZONATATE 100 MG/1
100 CAPSULE ORAL 3 TIMES DAILY PRN
Status: DISCONTINUED | OUTPATIENT
Start: 2021-07-11 | End: 2021-07-15 | Stop reason: HOSPADM

## 2021-07-11 RX ORDER — ONDANSETRON 2 MG/ML
4 INJECTION INTRAMUSCULAR; INTRAVENOUS EVERY 6 HOURS PRN
Status: DISCONTINUED | OUTPATIENT
Start: 2021-07-11 | End: 2021-07-14

## 2021-07-11 RX ORDER — ACETAMINOPHEN 650 MG/1
650 SUPPOSITORY RECTAL EVERY 4 HOURS PRN
Status: DISCONTINUED | OUTPATIENT
Start: 2021-07-11 | End: 2021-07-15 | Stop reason: HOSPADM

## 2021-07-11 RX ORDER — ALBUTEROL SULFATE 90 UG/1
2 AEROSOL, METERED RESPIRATORY (INHALATION) EVERY 6 HOURS PRN
Status: DISCONTINUED | OUTPATIENT
Start: 2021-07-11 | End: 2021-07-15 | Stop reason: HOSPADM

## 2021-07-11 RX ORDER — MELATONIN
2000 DAILY
Status: DISCONTINUED | OUTPATIENT
Start: 2021-07-11 | End: 2021-07-15 | Stop reason: HOSPADM

## 2021-07-11 RX ORDER — DEXTROMETHORPHAN POLISTIREX 30 MG/5ML
60 SUSPENSION ORAL EVERY 12 HOURS PRN
Status: DISCONTINUED | OUTPATIENT
Start: 2021-07-11 | End: 2021-07-15 | Stop reason: HOSPADM

## 2021-07-11 RX ORDER — LANOLIN ALCOHOL/MO/W.PET/CERES
3 CREAM (GRAM) TOPICAL NIGHTLY
Status: DISCONTINUED | OUTPATIENT
Start: 2021-07-11 | End: 2021-07-15 | Stop reason: HOSPADM

## 2021-07-11 RX ORDER — ASCORBIC ACID 500 MG
500 TABLET ORAL DAILY
Status: DISCONTINUED | OUTPATIENT
Start: 2021-07-11 | End: 2021-07-15 | Stop reason: HOSPADM

## 2021-07-11 RX ORDER — POTASSIUM CHLORIDE 750 MG/1
40 CAPSULE, EXTENDED RELEASE ORAL 2 TIMES DAILY
Status: COMPLETED | OUTPATIENT
Start: 2021-07-11 | End: 2021-07-11

## 2021-07-11 RX ORDER — ZINC SULFATE 50(220)MG
220 CAPSULE ORAL DAILY
Status: DISCONTINUED | OUTPATIENT
Start: 2021-07-11 | End: 2021-07-15 | Stop reason: HOSPADM

## 2021-07-11 RX ORDER — IPRATROPIUM BROMIDE AND ALBUTEROL SULFATE 2.5; .5 MG/3ML; MG/3ML
3 SOLUTION RESPIRATORY (INHALATION) EVERY 6 HOURS PRN
Status: DISCONTINUED | OUTPATIENT
Start: 2021-07-11 | End: 2021-07-15 | Stop reason: HOSPADM

## 2021-07-11 RX ADMIN — REMDESIVIR 200 MG: 100 INJECTION, POWDER, LYOPHILIZED, FOR SOLUTION INTRAVENOUS at 18:42

## 2021-07-11 RX ADMIN — ACETAMINOPHEN 650 MG: 325 TABLET, FILM COATED ORAL at 23:14

## 2021-07-11 RX ADMIN — SODIUM CHLORIDE, POTASSIUM CHLORIDE, SODIUM LACTATE AND CALCIUM CHLORIDE 75 ML/HR: 600; 310; 30; 20 INJECTION, SOLUTION INTRAVENOUS at 12:58

## 2021-07-11 RX ADMIN — ENOXAPARIN SODIUM 40 MG: 40 INJECTION SUBCUTANEOUS at 18:34

## 2021-07-11 RX ADMIN — POTASSIUM CHLORIDE 40 MEQ: 750 CAPSULE, EXTENDED RELEASE ORAL at 12:58

## 2021-07-11 RX ADMIN — Medication 2000 UNITS: at 12:59

## 2021-07-11 RX ADMIN — ZINC SULFATE 220 MG (50 MG) CAPSULE 220 MG: CAPSULE at 12:59

## 2021-07-11 RX ADMIN — SODIUM CHLORIDE, POTASSIUM CHLORIDE, SODIUM LACTATE AND CALCIUM CHLORIDE 1500 ML: 600; 310; 30; 20 INJECTION, SOLUTION INTRAVENOUS at 06:33

## 2021-07-11 RX ADMIN — ACETAMINOPHEN 650 MG: 325 TABLET, FILM COATED ORAL at 12:58

## 2021-07-11 RX ADMIN — FAMOTIDINE 20 MG: 10 INJECTION, SOLUTION INTRAVENOUS at 21:12

## 2021-07-11 RX ADMIN — POTASSIUM CHLORIDE 40 MEQ: 750 CAPSULE, EXTENDED RELEASE ORAL at 21:10

## 2021-07-11 RX ADMIN — FAMOTIDINE 20 MG: 10 INJECTION, SOLUTION INTRAVENOUS at 12:58

## 2021-07-11 RX ADMIN — ONDANSETRON 4 MG: 2 INJECTION INTRAMUSCULAR; INTRAVENOUS at 23:22

## 2021-07-11 RX ADMIN — ACETAMINOPHEN 650 MG: 325 TABLET, FILM COATED ORAL at 18:34

## 2021-07-11 RX ADMIN — OXYCODONE HYDROCHLORIDE AND ACETAMINOPHEN 500 MG: 500 TABLET ORAL at 12:59

## 2021-07-11 RX ADMIN — Medication 3 MG: at 21:12

## 2021-07-11 RX ADMIN — THIAMINE HCL TAB 100 MG 100 MG: 100 TAB at 12:59

## 2021-07-11 NOTE — H&P
[facility] Medicine Services  HISTORY AND PHYSICAL    Date of Admission: 7/11/2021  Primary Care Physician: Omar Muhammad MD    Subjective     Chief Complaint: Covid-19 pneumonia.  Metabolic alkalosis.    History of Present Illness  Patient is a 35-year-old black female presented to ER with with Covid symptoms started on 7/1/2021 with cough/chills/fever/nausea/vomiting/diarrhea.  Patient was diagnosed with Covid positive on 7/3/2021.  Patient was also evaluated in ER on 7/6 with benign work-up at that time.  Since then patient has not eat and drink that much.  Except some water.  Laboratory shows alkalosis.  Patient was screaming tach dyspnea upon arrival.  Patient was put on 2 L of oxygen.  Patient has finished course of antibiotics and steroids from previous visit to the ER.  Patient unable to hold anything down.  Patient however is 105.  Labs shows low sodium and low potassium level.  With renal insufficiency.    Review of Systems   Constitutional: Positive for activity change, appetite change and fatigue. Negative for chills and fever.   HENT: Negative for hearing loss, nosebleeds, tinnitus and trouble swallowing.    Eyes: Negative for visual disturbance.   Respiratory: Positive for cough and shortness of breath. Negative for chest tightness and wheezing.    Cardiovascular: Negative for chest pain, palpitations and leg swelling.   Gastrointestinal: Positive for nausea and vomiting. Negative for abdominal distention, abdominal pain, blood in stool, constipation and diarrhea.   Endocrine: Negative for cold intolerance, heat intolerance, polydipsia, polyphagia and polyuria.   Genitourinary: Negative for decreased urine volume, difficulty urinating, dysuria, flank pain, frequency and hematuria.   Musculoskeletal: Positive for arthralgias. Negative for joint swelling and myalgias.   Skin: Negative for rash.   Allergic/Immunologic: Negative for immunocompromised state.    Neurological: Positive for weakness. Negative for dizziness, syncope, light-headedness and headaches.   Hematological: Negative for adenopathy. Does not bruise/bleed easily.   Psychiatric/Behavioral: Negative for confusion and sleep disturbance. The patient is not nervous/anxious.         Otherwise complete ROS reviewed and negative except as mentioned in the HPI.      Past Medical History: History reviewed. No pertinent past medical history.    Past Surgical History:  Past Surgical History:   Procedure Laterality Date   •  SECTION         Family History: family history includes Cancer in her father; No Known Problems in her mother.    Social History:  reports that she has quit smoking. Her smoking use included cigarettes. She smoked 0.25 packs per day. She has never used smokeless tobacco. She reports current alcohol use. She reports that she does not use drugs.    Medications:  Prior to Admission medications    Medication Sig Start Date End Date Taking? Authorizing Provider   benzonatate (TESSALON) 200 MG capsule Take 1 capsule by mouth 3 (Three) Times a Day As Needed for Cough. 21   Anderson Granda MD   brompheniramine-pseudoephedrine-DM 30-2-10 MG/5ML syrup Take 5 mL by mouth 4 (Four) Times a Day As Needed for Congestion, Cough or Allergies. 7/3/21   Marilyn Terry APRN   dexamethasone (DECADRON) 1 MG tablet Take 1 tablet by mouth Daily. 21   Anderson Granda MD   fluticasone (Flonase) 50 MCG/ACT nasal spray 2 sprays into the nostril(s) as directed by provider Daily. 2 puffs each nostril 7/3/21   Marilyn Terry APRN   levonorgestrel-ethinyl estradiol (Lessina) 0.1-20 MG-MCG per tablet Take 1 tablet by mouth Daily.    Emergency, Nurse Epic, RN   promethazine (PHENERGAN) 25 MG tablet Take 1 tablet by mouth Every 6 (Six) Hours As Needed for Nausea or Vomiting. 21   Anderson Granda MD   triamterene-hydrochlorothiazide (MAXZIDE-25) 37.5-25 MG per tablet Take 1 tablet by  "mouth Daily.    Emergency, Nurse Isaiah, RN     Allergies:  No Known Allergies    Objective     Vital Signs: /82   Pulse 105   Temp 98.3 °F (36.8 °C) (Oral)   Resp 24   Ht 152.4 cm (60\")   Wt 70.8 kg (156 lb)   LMP 06/29/2021   SpO2 98%   BMI 30.47 kg/m²   Physical Exam        Results Reviewed:    Lab Results (last 24 hours)     Procedure Component Value Units Date/Time    Urinalysis, Microscopic Only - Urine, Clean Catch [[id number]]  (Abnormal) Collected: 07/11/21 0810    Specimen: Urine, Clean Catch Updated: 07/11/21 0855     RBC, UA 0-2 /HPF      WBC, UA 0-2 /HPF      Bacteria, UA None Seen /HPF      Squamous Epithelial Cells, UA 3-6 /HPF      Hyaline Casts, UA 0-2 /LPF      Methodology Automated Microscopy    Urinalysis With Culture If Indicated - Urine, Clean Catch [[id number]]  (Abnormal) Collected: 07/11/21 0810    Specimen: Urine, Clean Catch Updated: 07/11/21 0855     Color, UA Yellow     Appearance, UA Clear     pH, UA 7.0     Specific Gravity, UA 1.006     Glucose, UA Negative     Ketones, UA Negative     Bilirubin, UA Negative     Blood, UA Small (1+)     Protein, UA Negative     Leuk Esterase, UA Negative     Nitrite, UA Negative     Urobilinogen, UA 0.2 E.U./dL    D-dimer, Quantitative [[id number]]  (Abnormal) Collected: 07/11/21 0633    Specimen: Blood Updated: 07/11/21 0750     D-Dimer, Quantitative 0.53 mg/L (FEU)     Narrative:      Reference Range is 0-0.50 mg/L FEU. However, results <0.50 mg/L FEU tends to rule out DVT or PE. Results >0.50 mg/L FEU are not useful in predicting absence or presence of DVT or PE.      BNP [[id number]]  (Normal) Collected: 07/11/21 0633    Specimen: Blood Updated: 07/11/21 0711     proBNP <5.0 pg/mL     Narrative:      Among patients with dyspnea, NT-proBNP is highly sensitive for the detection of acute congestive heart failure. In addition NT-proBNP of <300 pg/ml effectively rules out acute congestive heart failure with 99% negative predictive " value.    Results may be falsely decreased if patient taking Biotin.      Comprehensive Metabolic Panel [[id number]]  (Abnormal) Collected: 07/11/21 0633    Specimen: Blood Updated: 07/11/21 0711     Glucose 87 mg/dL      BUN 19 mg/dL      Creatinine 1.27 mg/dL      Sodium 129 mmol/L      Potassium 3.2 mmol/L      Chloride 94 mmol/L      CO2 17.0 mmol/L      Calcium 9.0 mg/dL      Total Protein 8.1 g/dL      Albumin 4.20 g/dL      ALT (SGPT) 22 U/L      AST (SGOT) 32 U/L      Alkaline Phosphatase 37 U/L      Total Bilirubin 0.5 mg/dL      eGFR  African Amer 58 mL/min/1.73      Globulin 3.9 gm/dL      A/G Ratio 1.1 g/dL      BUN/Creatinine Ratio 15.0     Anion Gap 18.0 mmol/L     Narrative:      GFR Normal >60  Chronic Kidney Disease <60  Kidney Failure <15      Lipase [[id number]]  (Abnormal) Collected: 07/11/21 0633    Specimen: Blood Updated: 07/11/21 0711     Lipase 62 U/L     Troponin [[id number]]  (Normal) Collected: 07/11/21 0633    Specimen: Blood Updated: 07/11/21 0711     Troponin T <0.010 ng/mL     Narrative:      Troponin T Reference Range:  <= 0.03 ng/mL-   Negative for AMI  >0.03 ng/mL-     Abnormal for myocardial necrosis.  Clinicians would have to utilize clinical acumen, EKG, Troponin and serial changes to determine if it is an Acute Myocardial Infarction or myocardial injury due to an underlying chronic condition.       Results may be falsely decreased if patient taking Biotin.      CK [[id number]]  (Abnormal) Collected: 07/11/21 0633    Specimen: Blood Updated: 07/11/21 0711     Creatine Kinase 223 U/L     Phosphorus [[id number]]  (Abnormal) Collected: 07/11/21 0633    Specimen: Blood Updated: 07/11/21 0711     Phosphorus 2.0 mg/dL     Magnesium [[id number]]  (Normal) Collected: 07/11/21 0633    Specimen: Blood Updated: 07/11/21 0711     Magnesium 2.2 mg/dL     CBC & Differential [[id number]]  (Abnormal) Collected: 07/11/21 0633    Specimen: Blood Updated: 07/11/21 0654    Narrative:      The following  orders were created for panel order CBC & Differential.  Procedure                               Abnormality         Status                     ---------                               -----------         ------                     CBC Auto Differential[[id number]]        Abnormal            Final result                 Please view results for these tests on the individual orders.    CBC Auto Differential [[id number]]  (Abnormal) Collected: 07/11/21 0633    Specimen: Blood Updated: 07/11/21 0654     WBC 9.80 10*3/mm3      RBC 5.50 10*6/mm3      Hemoglobin 15.3 g/dL      Hematocrit 41.9 %      MCV 76.2 fL      MCH 27.8 pg      MCHC 36.5 g/dL      RDW 13.7 %      RDW-SD 37.4 fl      MPV 9.7 fL      Platelets 291 10*3/mm3      Neutrophil % 82.8 %      Lymphocyte % 10.2 %      Monocyte % 5.2 %      Eosinophil % 0.0 %      Basophil % 0.4 %      Immature Grans % 1.4 %      Neutrophils, Absolute 8.11 10*3/mm3      Lymphocytes, Absolute 1.00 10*3/mm3      Monocytes, Absolute 0.51 10*3/mm3      Eosinophils, Absolute 0.00 10*3/mm3      Basophils, Absolute 0.04 10*3/mm3      Immature Grans, Absolute 0.14 10*3/mm3      nRBC 0.0 /100 WBC     Blood Gas, Arterial - [[id number]]  (Abnormal) Collected: 07/11/21 0626    Specimen: Arterial Blood Updated: 07/11/21 0629     Site Right Radial     Ra's Test Positive     pH, Arterial 7.539 pH units      Comment: 83 Value above reference range        pCO2, Arterial 20.4 mm Hg      Comment: 84 Value below reference range        pO2, Arterial 67.9 mm Hg      Comment: 84 Value below reference range        HCO3, Arterial 17.4 mmol/L      Comment: 84 Value below reference range        Base Excess, Arterial -2.7 mmol/L      Comment: 84 Value below reference range        O2 Saturation, Arterial 95.7 %      Temperature 37.0 C      Barometric Pressure for Blood Gas 746 mmHg      Modality Room Air     Ventilator Mode NA     Collected by [id number]     Comment: Meter: [id number]     :   [id number]        pCO2, Temperature Corrected 20.4 mm Hg      pH, Temp Corrected 7.539 pH Units      pO2, Temperature Corrected 67.9 mm Hg            Radiology Data:    Imaging Results (Last 24 Hours)     Procedure Component Value Units Date/Time    XR Chest 1 View [[id number]] Collected: 07/11/21 0801     Updated: 07/11/21 0804    Narrative:      EXAMINATION: XR CHEST 1 VW-     7/11/2021 6:13 AM CDT     HISTORY: Shortness of breath.     1 view chest x-ray compared with July 6, 2021.     The heart and mediastinum are within normal limits.     Slightly lower lung volumes with mild interstitial infiltrate now  present in both lower lobes.     No pneumothorax.     Summary:  1. Mild interstitial infiltrate now present in both lower lobes.     This report was finalized on 07/11/2021 08:01 by Dr. Ben Connell MD.          I have personally reviewed and interpreted the radiology studies and ECG obtained at time of admission.     Assessment / Plan      Assessment & Plan  Active Hospital Problems    Diagnosis    • Pneumonia due to COVID-19 virus    • Nausea & vomiting        Plan.  Covid pneumonia/tachypnea.  Patient is requiring 2 L oxygen upon arrival.  Oxygen was took off by the night staff.  Patient's currently on 1.5 L oxygen..  Respiratory alkalosis.  C-reactive protein pending.  Pregnancy test pending.  Will start Covid pneumonia protocol.  Vitamin D.  Vitamin C.  Zinc.  Melatonin at night.  Lipitor.  Lovenox prophylaxis.  Patient just finished a course of steroids and antibiotics outpatient.  We will hold off steroids since the patient was not hypoxic even before the oxygen was put on.  Start patient remdesivir.  Chest x-ray-Mild interstitial infiltrate now present in both lower lobes.    Hyponatremia.  Slow IV fluid replacement.    Hypokalemia.  P.o. potassium.  Magnesium level-normal.    Acute renal injury.  Slow IV hydration.      Nausea/vomiting.  Pepcid.  Zofran.     Code Status: Full code.  If patient able to make  medical session patient's mom Funmi Morejon would make it for her.     I discussed the patient's findings and my recommendations with: Patient.    Estimated length of stay: 2 to 6 days    Electronically signed by Arben Olivier MD, 07/11/21, 9:12 AM CDT.

## 2021-07-11 NOTE — TELEPHONE ENCOUNTER
Reason for Disposition  • [1] COVID-19 diagnosed AND [2] has mild nausea, vomiting or diarrhea    Additional Information  • Negative: SEVERE difficulty breathing (e.g., struggling for each breath, speaks in single words)  • Negative: Difficult to awaken or acting confused (e.g., disoriented, slurred speech)  • Negative: Bluish (or gray) lips or face now  • Negative: Shock suspected (e.g., cold/pale/clammy skin, too weak to stand, low BP, rapid pulse)  • Negative: Sounds like a life-threatening emergency to the triager  • Negative: [1] COVID-19 exposure AND [2] has not completed COVID-19 vaccine series AND [3] no symptoms  • Negative: [1] COVID-19 exposure AND [2] completed COVID-19 vaccine series (fully vaccinated) AND [3] no symptoms  • Negative: COVID-19 vaccine reaction suspected (e.g., fever, headache, muscle aches) occurring during days 1-3 after getting vaccine  • Negative: COVID-19 vaccine, questions about  • Negative: [1] COVID-19 vaccine series completed (fully vaccinated) AND [2] new-onset of COVID-19 symptoms BUT [3] no known exposure  • Negative: [1] Had lab test confirmed COVID-19 infection within last 3 months AND [2] new-onset of COVID-19 symptoms BUT [3] no known exposure  • Negative: [1] Lives with someone known to have influenza (flu test positive) AND [2] flu-like symptoms (e.g., cough, runny nose, sore throat, SOB; with or without fever)  • Negative: [1] Adult with possible COVID-19 symptoms AND [2] triager concerned about severity of symptoms or other causes  • Negative: COVID-19 and breastfeeding, questions about  • Negative: SEVERE or constant chest pain or pressure (Exception: mild central chest pain, present only when coughing)  • Negative: MODERATE difficulty breathing (e.g., speaks in phrases, SOB even at rest, pulse 100-120)  • Negative: [1] Headache AND [2] stiff neck (can't touch chin to chest)  • Negative: MILD difficulty breathing (e.g., minimal/no SOB at rest, SOB with walking,  "pulse <100)  • Negative: Chest pain or pressure  • Negative: Patient sounds very sick or weak to the triager  • Negative: Fever > 103 F (39.4 C)  • Negative: [1] Fever > 101 F (38.3 C) AND [2] age > 60 years  • Negative: [1] Fever > 100.0 F (37.8 C) AND [2] bedridden (e.g., nursing home patient, CVA, chronic illness, recovering from surgery)  • Negative: [1] HIGH RISK patient (e.g., age > 64 years, diabetes, heart or lung disease, weak immune system) AND [2] new or worsening symptoms  • Negative: [1] HIGH RISK patient AND [2] influenza is widespread in the community AND [3] ONE OR MORE respiratory symptoms: cough, sore throat, runny or stuffy nose  • Negative: [1] HIGH RISK patient AND [2] influenza exposure within the last 7 days AND [3] ONE OR MORE respiratory symptoms: cough, sore throat, runny or stuffy nose  • Negative: Fever present > 3 days (72 hours)  • Negative: [1] Fever returns after gone for over 24 hours AND [2] symptoms worse or not improved  • Negative: [1] Continuous (nonstop) coughing interferes with work or school AND [2] no improvement using cough treatment per protocol  • Negative: [1] COVID-19 infection suspected by caller or triager AND [2] mild symptoms (cough, fever, or others) AND [3] no complications or SOB  • Negative: Cough present > 3 weeks  • Negative: [1] COVID-19 diagnosed by positive lab test AND [2] NO symptoms (e.g., cough, fever, others)  • Negative: [1] COVID-19 diagnosed by positive lab test AND [2] mild symptoms (e.g., cough, fever, others) AND [3] no complications or SOB  • Negative: [1] COVID-19 diagnosed by HCP (doctor, NP or PA) AND [2] mild symptoms (e.g., cough, fever, others) AND [3] no complications or SOB    Answer Assessment - Initial Assessment Questions  1. COVID-19 DIAGNOSIS: \"Who made your Coronavirus (COVID-19) diagnosis?\" \"Was it confirmed by a positive lab test?\" If not diagnosed by a HCP, ask \"Are there lots of cases (community spread) where you live?\" (See " "public health department website, if unsure)      [facility]  2. COVID-19 EXPOSURE: \"Was there any known exposure to COVID before the symptoms began?\" CDC Definition of close contact: within 6 feet (2 meters) for a total of 15 minutes or more over a 24-hour period.       yes  3. ONSET: \"When did the COVID-19 symptoms start?\"       6-8 days ago  4. WORST SYMPTOM: \"What is your worst symptom?\" (e.g., cough, fever, shortness of breath, muscle aches)      nausea  5. COUGH: \"Do you have a cough?\" If Yes, ask: \"How bad is the cough?\"        mild  6. FEVER: \"Do you have a fever?\" If Yes, ask: \"What is your temperature, how was it measured, and when did it start?\"      no  7. RESPIRATORY STATUS: \"Describe your breathing?\" (e.g., shortness of breath, wheezing, unable to speak)       Normal; has an occasional cough  8. BETTER-SAME-WORSE: \"Are you getting better, staying the same or getting worse compared to yesterday?\"  If getting worse, ask, \"In what way?\"      same  9. HIGH RISK DISEASE: \"Do you have any chronic medical problems?\" (e.g., asthma, heart or lung disease, weak immune system, obesity, etc.)      no  10. PREGNANCY: \"Is there any chance you are pregnant?\" \"When was your last menstrual period?\"        no  11. OTHER SYMPTOMS: \"Do you have any other symptoms?\"  (e.g., chills, fatigue, headache, loss of smell or taste, muscle pain, sore throat; new loss of smell or taste especially support the diagnosis of COVID-19)        Fatigue, loss of taste, diarrhea, nausea, muscle aches    Protocols used: CORONAVIRUS (COVID-19) DIAGNOSED OR SUSPECTED-ADULT-AH      "

## 2021-07-11 NOTE — ED PROVIDER NOTES
"Subjective   36 y/o female diagnosed as having COVID on 7/3 arrives for evaluation of COVID symptoms. She was seen by myself on  with relatively benign work up (just showing dehydration). She states since that time she has not really eaten or drank much. She notes today she had \"some water\" but really is not eating. She notes continued vomiting and cough. She denies any loss of taste or smell, falls or trauma. She notes continue cough. She is taking the medications I did provide her with (abx and steroids). She jayce any diarrhea or changes in medication. She arrives with noted tachycardia.           Review of Systems   All other systems reviewed and are negative.      History reviewed. No pertinent past medical history.    No Known Allergies    Past Surgical History:   Procedure Laterality Date   •  SECTION         Family History   Problem Relation Age of Onset   • No Known Problems Mother    • Cancer Father        Social History     Socioeconomic History   • Marital status: Single     Spouse name: Not on file   • Number of children: Not on file   • Years of education: Not on file   • Highest education level: Not on file   Tobacco Use   • Smoking status: Former Smoker     Packs/day: 0.25     Types: Cigarettes   • Smokeless tobacco: Never Used   Substance and Sexual Activity   • Alcohol use: Yes     Comment: one glass   • Drug use: Never           Objective   Physical Exam  Vitals and nursing note reviewed.   Constitutional:       Appearance: Normal appearance. She is normal weight.   HENT:      Head: Normocephalic and atraumatic.      Right Ear: External ear normal.      Left Ear: External ear normal.      Nose: Nose normal.      Mouth/Throat:      Mouth: Mucous membranes are moist.      Pharynx: Oropharynx is clear.   Eyes:      Conjunctiva/sclera: Conjunctivae normal.      Pupils: Pupils are equal, round, and reactive to light.   Cardiovascular:      Rate and Rhythm: Regular rhythm. Tachycardia " present.      Pulses: Normal pulses.      Heart sounds: Normal heart sounds.   Pulmonary:      Effort: Tachypnea present.      Breath sounds: Rales present.   Abdominal:      General: Abdomen is flat. Bowel sounds are normal. There is no distension.      Palpations: There is no mass.      Tenderness: There is no abdominal tenderness. There is no guarding or rebound.      Hernia: No hernia is present.   Musculoskeletal:         General: No swelling or tenderness. Normal range of motion.      Cervical back: Normal range of motion.   Skin:     General: Skin is warm.      Capillary Refill: Capillary refill takes less than 2 seconds.   Neurological:      General: No focal deficit present.      Mental Status: She is alert and oriented to person, place, and time. Mental status is at baseline.      Cranial Nerves: No cranial nerve deficit.      Sensory: No sensory deficit.      Motor: No weakness.      Coordination: Coordination normal.   Psychiatric:         Mood and Affect: Mood normal.         Behavior: Behavior normal.         Thought Content: Thought content normal.         ECG 12 Lead      Date/Time: 7/11/2021 5:59 AM  Performed by: Anderson Granda MD  Authorized by: Emergency, Triage Protocol, MD   Interpreted by physician  Rhythm: sinus tachycardia  Rate: tachycardic  BPM: 137  QRS axis: normal  Conduction: right bundle branch block                 ED Course  ED Course as of Jul 11 1839   Sun Jul 11, 2021   0824 Shock index 1.2  Primary respiratory alkalosis with sec met acidosis       [TS]   0827 Ag 18    [TS]   0828 Delta ag = 8  Delta hco3 =7  Anion gap metabolic acidosis with metabolic acidosis     [TS]   0840 MAP 88  [place] II :8    [TS]   0846 This patient is 35 years old with underlying renal insufficiency with hyponatremia currently has acute respiratory alkalosis underlying metabolic alkalosis and metabolic acidosis part of that is hyperventilation secondary to her hypoxia along with the fact that she  is vomiting and has got some renal insufficiency.  This is the patient's third visit to the hospital her prior CAT scan of the chest done recently has been negative D-dimer is negative today.  I have discussed this case with the hospitalist service they are going to review the case.  Patient is still nauseous she has not vomited over the past hour and a half.  But appears sick.  She is not persistently requiring oxygen she states 7 she moves from the bed even to go to the bathroom she starts getting short of breath.  Not obtain ambulatory pulse oximeter on her as yet.  Her chest x-ray shows worsening pulmonary infiltrates that along with her anion gap metabolic acidosis    [TS]      ED Course User Index  [TS] Otto Roberson MD            XR Chest 1 View   Final Result        Labs Reviewed   COMPREHENSIVE METABOLIC PANEL - Abnormal; Notable for the following components:       Result Value    Creatinine 1.27 (*)     Sodium 129 (*)     Potassium 3.2 (*)     Chloride 94 (*)     CO2 17.0 (*)     Alkaline Phosphatase 37 (*)     eGFR   Amer 58 (*)     Anion Gap 18.0 (*)     All other components within normal limits    Narrative:     GFR Normal >60  Chronic Kidney Disease <60  Kidney Failure <15     LIPASE - Abnormal; Notable for the following components:    Lipase 62 (*)     All other components within normal limits   PHOSPHORUS - Abnormal; Notable for the following components:    Phosphorus 2.0 (*)     All other components within normal limits   URINALYSIS W/ CULTURE IF INDICATED - Abnormal; Notable for the following components:    Blood, UA Small (1+) (*)     All other components within normal limits   CK - Abnormal; Notable for the following components:    Creatine Kinase 223 (*)     All other components within normal limits   CBC WITH AUTO DIFFERENTIAL - Abnormal; Notable for the following components:    RBC 5.50 (*)     MCV 76.2 (*)     MCHC 36.5 (*)     Neutrophil % 82.8 (*)     Lymphocyte % 10.2 (*)      Eosinophil % 0.0 (*)     Immature Grans % 1.4 (*)     Neutrophils, Absolute 8.11 (*)     Immature Grans, Absolute 0.14 (*)     All other components within normal limits   D-DIMER, QUANTITATIVE - Abnormal; Notable for the following components:    D-Dimer, Quantitative 0.53 (*)     All other components within normal limits    Narrative:     Reference Range is 0-0.50 mg/L FEU. However, results <0.50 mg/L FEU tends to rule out DVT or PE. Results >0.50 mg/L FEU are not useful in predicting absence or presence of DVT or PE.     BLOOD GAS, ARTERIAL - Abnormal; Notable for the following components:    pH, Arterial 7.539 (*)     pCO2, Arterial 20.4 (*)     pO2, Arterial 67.9 (*)     HCO3, Arterial 17.4 (*)     Base Excess, Arterial -2.7 (*)     pCO2, Temperature Corrected 20.4 (*)     pH, Temp Corrected 7.539 (*)     pO2, Temperature Corrected 67.9 (*)     All other components within normal limits   C-REACTIVE PROTEIN - Abnormal; Notable for the following components:    C-Reactive Protein 10.26 (*)     All other components within normal limits   URINALYSIS, MICROSCOPIC ONLY - Abnormal; Notable for the following components:    RBC, UA 0-2 (*)     WBC, UA 0-2 (*)     Squamous Epithelial Cells, UA 3-6 (*)     All other components within normal limits   FERRITIN - Abnormal; Notable for the following components:    Ferritin 1,450.00 (*)     All other components within normal limits    Narrative:     Results may be falsely decreased if patient taking Biotin.     LACTATE DEHYDROGENASE - Abnormal; Notable for the following components:     (*)     All other components within normal limits   BNP (IN-HOUSE) - Normal    Narrative:     Among patients with dyspnea, NT-proBNP is highly sensitive for the detection of acute congestive heart failure. In addition NT-proBNP of <300 pg/ml effectively rules out acute congestive heart failure with 99% negative predictive value.    Results may be falsely decreased if patient taking Biotin.    "  TROPONIN (IN-HOUSE) - Normal    Narrative:     Troponin T Reference Range:  <= 0.03 ng/mL-   Negative for AMI  >0.03 ng/mL-     Abnormal for myocardial necrosis.  Clinicians would have to utilize clinical acumen, EKG, Troponin and serial changes to determine if it is an Acute Myocardial Infarction or myocardial injury due to an underlying chronic condition.       Results may be falsely decreased if patient taking Biotin.     MAGNESIUM - Normal   PROCALCITONIN - Normal    Narrative:     As a Marker for Sepsis (Non-Neonates):     1. <0.5 ng/mL represents a low risk of severe sepsis and/or septic shock.  2. >2 ng/mL represents a high risk of severe sepsis and/or septic shock.    As a Marker for Lower Respiratory Tract Infections that require antibiotic therapy:  PCT on Admission     Antibiotic Therapy             6-12 Hrs later  >0.5                          Strongly Recommended            >0.25 - <0.5             Recommended  0.1 - 0.25                  Discouraged                       Remeasure/reassess PCT  <0.1                         Strongly Discouraged         Remeasure/reassess PCT      As 28 day mortality risk marker: \"Change in Procalcitonin Result\" (>80% or <=80%) if Day 0 (or Day 1) and Day 4 values are available. Refer to http://www.[employer]-pct-calculator.com/    Change in PCT <=80 %   A decrease of PCT levels below or equal to 80% defines a positive change in PCT test result representing a higher risk for 28-day all-cause mortality of patients diagnosed with severe sepsis or septic shock.    Change in PCT >80 %   A decrease of PCT levels of more than 80% defines a negative change in PCT result representing a lower risk for 28-day all-cause mortality of patients diagnosed with severe sepsis or septic shock.               BLOOD CULTURE WITH RHEA   BLOOD CULTURE WITH RHEA   LEGIONELLA ANTIGEN, URINE   STREP PNEUMO AG, URINE OR CSF   RESPIRATORY PANEL, PCR (WITHOUT COVID)   BLOOD GAS, ARTERIAL   POCT PEFORM " URINE PREGNANCY   TYPE AND SCREEN   CBC AND DIFFERENTIAL    Narrative:     The following orders were created for panel order CBC & Differential.  Procedure                               Abnormality         Status                     ---------                               -----------         ------                     CBC Auto Differential[[id number]]        Abnormal            Final result                 Please view results for these tests on the individual orders.                                       MDM    Final diagnoses:   Systemic inflammatory response syndrome (CMS/HCC)   Renal insufficiency   High anion gap metabolic acidosis   COVID-19       ED Disposition  ED Disposition     ED Disposition Condition Comment    Decision to Admit  Level of Care: Telemetry [5]   Diagnosis: Systemic inflammatory response syndrome (CMS/HCC) [609162]   Certification: I Certify That Inpatient Hospital Services Are Medically Necessary For Greater Than 2 Midnights            No follow-up provider specified.       Medication List      No changes were made to your prescriptions during this visit.          Anderson Granda MD  07/11/21 0154

## 2021-07-11 NOTE — ED PROVIDER NOTES
Subjective   History of Present Illness    Review of Systems    History reviewed. No pertinent past medical history.    No Known Allergies    Past Surgical History:   Procedure Laterality Date   •  SECTION         Family History   Problem Relation Age of Onset   • No Known Problems Mother    • Cancer Father        Social History     Socioeconomic History   • Marital status: Single     Spouse name: Not on file   • Number of children: Not on file   • Years of education: Not on file   • Highest education level: Not on file   Tobacco Use   • Smoking status: Former Smoker     Packs/day: 0.25     Types: Cigarettes   • Smokeless tobacco: Never Used   Substance and Sexual Activity   • Alcohol use: Yes     Comment: one glass   • Drug use: Never           Objective   Physical Exam    Procedures           ED Course  ED Course as of 923   Sun 2021   0824 Shock index 1.2  Primary respiratory alkalosis with sec met acidosis       [TS]   0827 Ag 18    [TS]   0828 Delta ag = 8  Delta hco3 =7  Anion gap metabolic acidosis with metabolic acidosis     [TS]   0840 MAP 88  [place] II :8    [TS]   0846 This patient is 35 years old with underlying renal insufficiency with hyponatremia currently has acute respiratory alkalosis underlying metabolic alkalosis and metabolic acidosis part of that is hyperventilation secondary to her hypoxia along with the fact that she is vomiting and has got some renal insufficiency.  This is the patient's third visit to the hospital her prior CAT scan of the chest done recently has been negative D-dimer is negative today.  I have discussed this case with the hospitalist service they are going to review the case.  Patient is still nauseous she has not vomited over the past hour and a half.  But appears sick.  She is not persistently requiring oxygen she states 7 she moves from the bed even to go to the bathroom she starts getting short of breath.  Not obtain ambulatory pulse oximeter on  her as yet.  Her chest x-ray shows worsening pulmonary infiltrates that along with her anion gap metabolic acidosis    [TS]      ED Course User Index  [TS] Otto Roberson MD                                           MDM    Final diagnoses:   Systemic inflammatory response syndrome (CMS/HCC)   Renal insufficiency   High anion gap metabolic acidosis   COVID-19       ED Disposition  ED Disposition     ED Disposition Condition Comment    Decision to Admit  Level of Care: Telemetry [5]   Diagnosis: Systemic inflammatory response syndrome (CMS/HCC) [103492]   Admitting Physician: IVONNE CHAVEZ [[id number]]   Attending Physician: IVONNE CHAVEZ [[id number]]   Certification: I Certify That Inpatient Hospital Services Are Medically Necessary For Greater Than 2 Midnights            No follow-up provider specified.       Medication List      No changes were made to your prescriptions during this visit.          Otto Roberson MD  07/11/21 0689       Otto Roberson MD  07/11/21 2563

## 2021-07-12 PROBLEM — A41.89 VIRAL SEPSIS (HCC): Status: ACTIVE | Noted: 2021-07-11

## 2021-07-12 PROBLEM — E87.6 HYPOKALEMIA: Status: ACTIVE | Noted: 2021-07-12

## 2021-07-12 PROBLEM — B97.89 VIRAL SEPSIS (HCC): Status: ACTIVE | Noted: 2021-07-11

## 2021-07-12 PROBLEM — E87.1 HYPONATREMIA: Status: ACTIVE | Noted: 2021-07-12

## 2021-07-12 PROBLEM — R19.7 NAUSEA, VOMITING, AND DIARRHEA: Status: ACTIVE | Noted: 2021-07-11

## 2021-07-12 PROBLEM — E66.9 OBESITY (BMI 30-39.9): Status: ACTIVE | Noted: 2021-07-12

## 2021-07-12 LAB
ALBUMIN SERPL-MCNC: 3.6 G/DL (ref 3.5–5.2)
ALBUMIN/GLOB SERPL: 1.1 G/DL
ALP SERPL-CCNC: 32 U/L (ref 39–117)
ALT SERPL W P-5'-P-CCNC: 21 U/L (ref 1–33)
ANION GAP SERPL CALCULATED.3IONS-SCNC: 13 MMOL/L (ref 5–15)
AST SERPL-CCNC: 30 U/L (ref 1–32)
BASOPHILS # BLD AUTO: 0.02 10*3/MM3 (ref 0–0.2)
BASOPHILS NFR BLD AUTO: 0.3 % (ref 0–1.5)
BILIRUB SERPL-MCNC: 0.3 MG/DL (ref 0–1.2)
BUN SERPL-MCNC: 10 MG/DL (ref 6–20)
BUN/CREAT SERPL: 9.4 (ref 7–25)
CALCIUM SPEC-SCNC: 8.8 MG/DL (ref 8.6–10.5)
CHLORIDE SERPL-SCNC: 100 MMOL/L (ref 98–107)
CO2 SERPL-SCNC: 22 MMOL/L (ref 22–29)
CREAT SERPL-MCNC: 1.06 MG/DL (ref 0.57–1)
D DIMER PPP FEU-MCNC: 0.51 MG/L (FEU) (ref 0–0.5)
DEPRECATED RDW RBC AUTO: 38.2 FL (ref 37–54)
EOSINOPHIL # BLD AUTO: 0 10*3/MM3 (ref 0–0.4)
EOSINOPHIL NFR BLD AUTO: 0 % (ref 0.3–6.2)
ERYTHROCYTE [DISTWIDTH] IN BLOOD BY AUTOMATED COUNT: 13.7 % (ref 12.3–15.4)
FIBRINOGEN PPP-MCNC: 559 MG/DL (ref 240–460)
GFR SERPL CREATININE-BSD FRML MDRD: 72 ML/MIN/1.73
GLOBULIN UR ELPH-MCNC: 3.2 GM/DL
GLUCOSE SERPL-MCNC: 96 MG/DL (ref 65–99)
HCT VFR BLD AUTO: 34.8 % (ref 34–46.6)
HGB BLD-MCNC: 12.8 G/DL (ref 12–15.9)
IMM GRANULOCYTES # BLD AUTO: 0.11 10*3/MM3 (ref 0–0.05)
IMM GRANULOCYTES NFR BLD AUTO: 1.5 % (ref 0–0.5)
LDH SERPL-CCNC: 369 U/L (ref 135–214)
LYMPHOCYTES # BLD AUTO: 1.65 10*3/MM3 (ref 0.7–3.1)
LYMPHOCYTES NFR BLD AUTO: 22.7 % (ref 19.6–45.3)
MAGNESIUM SERPL-MCNC: 1.8 MG/DL (ref 1.6–2.6)
MCH RBC QN AUTO: 28.3 PG (ref 26.6–33)
MCHC RBC AUTO-ENTMCNC: 36.8 G/DL (ref 31.5–35.7)
MCV RBC AUTO: 77 FL (ref 79–97)
MONOCYTES # BLD AUTO: 0.51 10*3/MM3 (ref 0.1–0.9)
MONOCYTES NFR BLD AUTO: 7 % (ref 5–12)
NEUTROPHILS NFR BLD AUTO: 4.98 10*3/MM3 (ref 1.7–7)
NEUTROPHILS NFR BLD AUTO: 68.5 % (ref 42.7–76)
NRBC BLD AUTO-RTO: 0 /100 WBC (ref 0–0.2)
PLATELET # BLD AUTO: 285 10*3/MM3 (ref 140–450)
PMV BLD AUTO: 9.6 FL (ref 6–12)
POTASSIUM SERPL-SCNC: 3.3 MMOL/L (ref 3.5–5.2)
PROCALCITONIN SERPL-MCNC: 0.19 NG/ML (ref 0–0.25)
PROT SERPL-MCNC: 6.8 G/DL (ref 6–8.5)
RBC # BLD AUTO: 4.52 10*6/MM3 (ref 3.77–5.28)
SODIUM SERPL-SCNC: 135 MMOL/L (ref 136–145)
WBC # BLD AUTO: 7.27 10*3/MM3 (ref 3.4–10.8)

## 2021-07-12 PROCEDURE — 84145 PROCALCITONIN (PCT): CPT | Performed by: FAMILY MEDICINE

## 2021-07-12 PROCEDURE — 85384 FIBRINOGEN ACTIVITY: CPT | Performed by: FAMILY MEDICINE

## 2021-07-12 PROCEDURE — 94799 UNLISTED PULMONARY SVC/PX: CPT

## 2021-07-12 PROCEDURE — 85025 COMPLETE CBC W/AUTO DIFF WBC: CPT | Performed by: FAMILY MEDICINE

## 2021-07-12 PROCEDURE — 80053 COMPREHEN METABOLIC PANEL: CPT | Performed by: FAMILY MEDICINE

## 2021-07-12 PROCEDURE — 83615 LACTATE (LD) (LDH) ENZYME: CPT | Performed by: FAMILY MEDICINE

## 2021-07-12 PROCEDURE — 85379 FIBRIN DEGRADATION QUANT: CPT | Performed by: FAMILY MEDICINE

## 2021-07-12 PROCEDURE — 25810000003 SODIUM CHLORIDE 0.9 % WITH KCL 20 MEQ 20-0.9 MEQ/L-% SOLUTION: Performed by: INTERNAL MEDICINE

## 2021-07-12 PROCEDURE — 83735 ASSAY OF MAGNESIUM: CPT | Performed by: FAMILY MEDICINE

## 2021-07-12 PROCEDURE — 36415 COLL VENOUS BLD VENIPUNCTURE: CPT | Performed by: FAMILY MEDICINE

## 2021-07-12 PROCEDURE — 25010000002 ENOXAPARIN PER 10 MG: Performed by: FAMILY MEDICINE

## 2021-07-12 RX ORDER — SODIUM CHLORIDE, SODIUM LACTATE, POTASSIUM CHLORIDE, CALCIUM CHLORIDE 600; 310; 30; 20 MG/100ML; MG/100ML; MG/100ML; MG/100ML
250 INJECTION, SOLUTION INTRAVENOUS
Status: COMPLETED | OUTPATIENT
Start: 2021-07-12 | End: 2021-07-12

## 2021-07-12 RX ORDER — GUAIFENESIN 600 MG/1
1200 TABLET, EXTENDED RELEASE ORAL EVERY 12 HOURS SCHEDULED
Status: DISCONTINUED | OUTPATIENT
Start: 2021-07-12 | End: 2021-07-15 | Stop reason: HOSPADM

## 2021-07-12 RX ORDER — SODIUM CHLORIDE AND POTASSIUM CHLORIDE 150; 900 MG/100ML; MG/100ML
60 INJECTION, SOLUTION INTRAVENOUS CONTINUOUS
Status: DISCONTINUED | OUTPATIENT
Start: 2021-07-12 | End: 2021-07-14

## 2021-07-12 RX ORDER — LOPERAMIDE HYDROCHLORIDE 2 MG/1
2 CAPSULE ORAL EVERY 6 HOURS PRN
Status: DISCONTINUED | OUTPATIENT
Start: 2021-07-12 | End: 2021-07-15 | Stop reason: HOSPADM

## 2021-07-12 RX ADMIN — OXYCODONE HYDROCHLORIDE AND ACETAMINOPHEN 500 MG: 500 TABLET ORAL at 08:20

## 2021-07-12 RX ADMIN — Medication 3 MG: at 21:10

## 2021-07-12 RX ADMIN — ZINC SULFATE 220 MG (50 MG) CAPSULE 220 MG: CAPSULE at 08:20

## 2021-07-12 RX ADMIN — ENOXAPARIN SODIUM 40 MG: 40 INJECTION SUBCUTANEOUS at 18:06

## 2021-07-12 RX ADMIN — ACETAMINOPHEN 650 MG: 325 TABLET, FILM COATED ORAL at 05:27

## 2021-07-12 RX ADMIN — FAMOTIDINE 20 MG: 10 INJECTION, SOLUTION INTRAVENOUS at 08:20

## 2021-07-12 RX ADMIN — REMDESIVIR 100 MG: 100 INJECTION, POWDER, LYOPHILIZED, FOR SOLUTION INTRAVENOUS at 18:06

## 2021-07-12 RX ADMIN — POTASSIUM CHLORIDE AND SODIUM CHLORIDE 75 ML/HR: 900; 150 INJECTION, SOLUTION INTRAVENOUS at 12:19

## 2021-07-12 RX ADMIN — GUAIFENESIN 1200 MG: 600 TABLET, EXTENDED RELEASE ORAL at 21:10

## 2021-07-12 RX ADMIN — FAMOTIDINE 20 MG: 10 INJECTION, SOLUTION INTRAVENOUS at 21:10

## 2021-07-12 RX ADMIN — SODIUM CHLORIDE, POTASSIUM CHLORIDE, SODIUM LACTATE AND CALCIUM CHLORIDE 250 ML: 600; 310; 30; 20 INJECTION, SOLUTION INTRAVENOUS at 01:21

## 2021-07-12 RX ADMIN — LOPERAMIDE HYDROCHLORIDE 2 MG: 2 CAPSULE ORAL at 01:20

## 2021-07-12 RX ADMIN — ACETAMINOPHEN 650 MG: 325 TABLET, FILM COATED ORAL at 12:40

## 2021-07-12 RX ADMIN — THIAMINE HCL TAB 100 MG 100 MG: 100 TAB at 08:20

## 2021-07-12 RX ADMIN — Medication 2000 UNITS: at 08:20

## 2021-07-12 NOTE — PLAN OF CARE
Goal Outcome Evaluation:  Plan of Care Reviewed With: patient        Progress: no change  Outcome Summary: pt states she occassionally has coughing spells that brings up yellowish thick sputum and cause SOA. Pt O2 sat adequate on RA. Vitals stable. up to bathroom with some weakness noted. SR-ST  with PVC; HR to 141 at one point.

## 2021-07-12 NOTE — PLAN OF CARE
Goal Outcome Evaluation:  Plan of Care Reviewed With: patient        Progress: no change  Outcome Summary: 02 sats WNL on RA, HR elevated had gotten up to 162 while in BR, MD called and  orders given for a 250 ml bolus of LR with lab for BMP an Mag, pt c/o headache tylenol given, pt had a fever this shift, pt c/o of diarrhea, immodium given x1, ST  up to 162 with coup and mf.

## 2021-07-12 NOTE — PROGRESS NOTES
[facility] Medicine Services  INPATIENT PROGRESS NOTE    Patient Name: Elena Glaser  Date of Admission: 7/11/2021  Today's Date: 07/12/21  Length of Stay: 1  Primary Care Physician: Omar Muhammad MD    Subjective   Chief Complaint: Fatigue.   HPI   She is feeling quite a bit better today.  Still very fatigued.  Her main symptoms have been GI in nature.  She has been having problems with recurrent loose stool and nausea/vomiting for quite some time now.  She first became symptomatic on 7/1 and tested positive on 7/3.  She works at [employer] and apparently is not masking any longer when at work.  She is unvaccinated.    Dr. Olivier placed her on remdesivir yesterday despite having a room air saturation of 98%.  Today is her second dose of this medication.  Would discontinue this tomorrow after third dose.    She does not have much of a cough, but states that she gets winded with getting up and going to the bathroom.    I told her that if she continues to not require oxygen, stays fever free, and continues to feel better that she might be able to go home tomorrow.    Review of Systems   All pertinent negatives and positives are as above. All other systems have been reviewed and are negative unless otherwise stated.     Objective    Temp:  [98.7 °F (37.1 °C)-100.7 °F (38.2 °C)] 98.7 °F (37.1 °C)  Heart Rate:  [105-124] 105  Resp:  [20-22] 20  BP: (110-137)/(80-92) 110/80  Physical Exam  Constitutional:       Appearance: She is well-developed.      Comments: Up in bed.  No distress.  Watching television.  Discussed with her nurse, Cara    HENT:      Head: Normocephalic and atraumatic.   Eyes:      Conjunctiva/sclera: Conjunctivae normal.      Pupils: Pupils are equal, round, and reactive to light.   Neck:      Vascular: No JVD.   Cardiovascular:      Rate and Rhythm: Normal rate and regular rhythm.      Heart sounds: Normal heart sounds. No murmur heard.   No  friction rub. No gallop.    Pulmonary:      Effort: Pulmonary effort is normal. No respiratory distress.      Breath sounds: Normal breath sounds. No wheezing or rales.      Comments: On room air with no respiratory distress.  Chest:      Chest wall: No tenderness.   Abdominal:      General: Bowel sounds are normal. There is no distension.      Palpations: Abdomen is soft.      Tenderness: There is no abdominal tenderness. There is no guarding or rebound.   Musculoskeletal:         General: No tenderness or deformity. Normal range of motion.      Cervical back: Neck supple.   Skin:     General: Skin is warm and dry.      Findings: No rash.   Neurological:      Mental Status: She is alert and oriented to person, place, and time.      Cranial Nerves: No cranial nerve deficit.      Motor: No abnormal muscle tone.      Deep Tendon Reflexes: Reflexes normal.   Psychiatric:         Behavior: Behavior normal.         Thought Content: Thought content normal.         Judgment: Judgment normal.       Results Review:  I have reviewed the labs, radiology results, and diagnostic studies.    Laboratory Data:   Results from last 7 days   Lab Units 07/12/21 0242 07/11/21 0633 07/06/21 2009   WBC 10*3/mm3 7.27 9.80 7.15   HEMOGLOBIN g/dL 12.8 15.3 16.1*   HEMATOCRIT % 34.8 41.9 45.5   PLATELETS 10*3/mm3 285 291 249     Results from last 7 days   Lab Units 07/12/21 0242 07/11/21 0633 07/06/21 2009   SODIUM mmol/L 135* 129* 134*   POTASSIUM mmol/L 3.3* 3.2* 3.5   CHLORIDE mmol/L 100 94* 99   CO2 mmol/L 22.0 17.0* 20.0*   BUN mg/dL 10 19 14   CREATININE mg/dL 1.06* 1.27* 1.48*   CALCIUM mg/dL 8.8 9.0 9.8   BILIRUBIN mg/dL 0.3 0.5 0.3   ALK PHOS U/L 32* 37* 51   ALT (SGPT) U/L 21 22 34*   AST (SGOT) U/L 30 32 32   GLUCOSE mg/dL 96 87 90     COVID LABS:  Results From Last 14 Days   Lab Units 07/12/21 0242 07/11/21 0633 07/06/21 2009   PROBNP pg/mL  --  <5.0  --    CK TOTAL U/L  --  223*  --    CRP mg/dL  --  10.26*  --    D  DIMER QUANT mg/L (FEU) 0.51* 0.53* 0.53*   FERRITIN ng/mL  --  1,450.00*  --    LDH U/L 369* 384*  --    PROCALCITONIN ng/mL 0.19 0.16  --    TROPONIN T ng/mL  --  <0.010  --      Culture Data:   Blood Culture   Date Value Ref Range Status   07/11/2021 No growth at less than 24 hours  Preliminary   07/11/2021 No growth at less than 24 hours  Preliminary   07/06/2021 No growth at 5 days  Final   07/06/2021 No growth at 5 days  Final     I have reviewed the patient's current medications.     Assessment/Plan     Active Hospital Problems    Diagnosis    • **Pneumonia due to COVID-19 virus    • Viral sepsis (CMS/HCC)    • Nausea, vomiting, and diarrhea    • Hyponatremia    • Hypokalemia    • Obesity (BMI 30-39.9)      Plan:  The patient was admitted on 7/11 by Dr. Olivier.  She has known COVID-19 infection.  She first tested positive for Covid on 7/3 at urgent care.  She was reevaluated in the ER on 7/6 for ongoing symptoms.  She returned to the emergency department on 7/11 with persistent nausea and vomiting.  She was found to have an ASUNCION and a serum sodium of 129.  She was referred for admission by the emergency department.    She has most of the classic symptoms for COVID-19.  However, 1 seems to be most pronounced is the GI issues.  She has been having quite a bit of diarrhea accompanied by nausea and vomiting.  She has had a difficult time with intake.  She was found to have an ASUNCION and to be hyponatremic.  Her serum creatinine is improving as is her sodium.  Replace potassium.    She has multifocal peripheral interstitial infiltrates quite atypical in distribution and appearance for COVID-19 pneumonia.  However, she has not been hypoxic.  She has no pulmonary thromboembolic disease.    Dr. Olivier started her on remdesivir with no overt hypoxemia.  This medication is not necessarily indicated at this point in time for this patient.  However, she was initiated on it and will at least let her complete 3 days.   Reevaluate tomorrow for discontinuation of this medication.  Steroids were not initiated by Dr. Olivier and there is currently no indication to do so.  Continue adjunctive treatments.    She displayed signs of viral sepsis.  Her sinus tachycardia has improved significantly since presentation.  Her blood pressure is stable.  She remains on room air.    Reconcile and resume home medications as appropriate.  Oral contraceptive and Maxide to be on hold at present.    Lovenox for DVT prophylaxis.    Discharge Planning: I expect the patient to be discharged to home in 1-2 days.    Electronically signed by Marc Merlos DO, 07/12/21, 10:26 CDT.

## 2021-07-12 NOTE — PAYOR COMM NOTE
"7/12/21 [facility] [phone]  [phone]    PATIENT ER ADMISSION TO INPATIENT. INPATIENT ORDER.    FASIMA ILIANA.              Elena Glaser (35 y.o. Female)     Date of Birth Social Security Number Address Home Phone MRN    1985  [address]  [address]  [facility] [id number] [phone] [id number]    [employer] Marital Status          Other Single       Admission Date Admission Type Admitting Provider Attending Provider Department, Room/Bed    7/11/21 Emergency Marc Merlos DO Hancock, John C, DO [address], 443/1    Discharge Date Discharge Disposition Discharge Destination                       Attending Provider: Marc Merlos DO    Allergies: No Known Allergies    Isolation: Enh Drop/Con   Infection: COVID (confirmed) (07/03/21)   Code Status: CPR    Ht: 152.4 cm (60\")   Wt: 74.7 kg (164 lb 11.2 oz)    Admission Cmt: None   Principal Problem: None                Active Insurance as of 7/11/2021     Primary Coverage     Payor Plan Insurance Group Employer/Plan Group    [employer]HE[employer] BLUE CROSS AN[employer]M BLUE CROSS BLUE SHIELD PPO [id number]     Payor Plan Address Payor Plan Phone Number Payor Plan Fax Number Effective Dates    [address] [phone]  1/1/2013 - None Entered    [place] [address]       Subscriber Name Subscriber Birth Date Member ID       ELENA GLASER 1985 [id number]                 Emergency Contacts      (Rel.) Home Phone Work Phone Mobile Phone    JUWAN RAMEY (Mother) [phone] -- --        [facility] Encounter Date/Time: 7/11/2021 0529   Hospital Account: [id number]    MRN: [id number]   Patient:  Elena Glaser   Contact Serial #: [id number]   SSN:          ENCOUNTER             Patient Class: Inpatient   Unit: [address] Service: Medicine     Bed: 443/1   Admitting Provider: Marc Merlos DO   Referring Physician:     Attending Provider: Marc Merlos DO   Adm Diagnosis: " Systemic inflammatory re*               PATIENT          Name: Elena Patel : 1985 (35 yrs)   Address: [address] Sex: Female   City: [place] [address]   County: [facility]   Marital Status: SINGLE Ethnicity: NOT                                                                              Race: BLACK   Primary Care Provider: Omar Muhammad MD Patients Phone: Home Phone: [phone]  Work Phone: [phone]  Mobile Phone: [phone]   EMERGENCY CONTACT   Contact Name Legal Guardian? Relationship to Patient Home Phone Work Phone   1. JUWAN RAMEY  2. *No Contact Specified*      Mother    [phone]              GUARANTOR            Guarantor: Elena Patel     : 1985   Address: [address] Sex: Female     [place] [address]     Relation to Patient: Self       Home Phone: [phone]   Guarantor ID: [id number]       Work Phone: [phone]   GUARANTOR EMPLOYER   Employer: [employer]         Status: FULL TIME   COVERAGE          PRIMARY INSURANCE   Payor: ANTHEM BLUE CROSS Plan: ANTHEM BLUE CROSS BLUE SHIELD PPO   Group Number: [id number] Insurance Type: INDEMNITY   Subscriber Name: ELENA PATEL Subscriber : 1985   Subscriber ID: [id number] Coverage Address: [place] [address]   Pat. Rel. to Subscriber: Self Coverage Phone: [phone]   SECONDARY INSURANCE   Payor: N/A Plan: N/A   Group Number:   Insurance Type:     Subscriber Name:   Subscriber :     Subscriber ID:   Coverage Address:     Pat. Rel. to Subscriber:   Coverage Phone:        Contact Serial # ([id number])  `       2021    Chart ID ([id number])             Department Encounter #   2021  5:29 AM  Pad 4b [id number]   Arben Olivier MD   Physician   Hospitalist   H&P      Signed   Date of Service:  21   Creation Time:  21            Signed        Expand AllCollapse All      Show:Clear  all  [x]Manual[x]Template[x]Copied    Added by:  [x]Arben Olivier MD    []Jessy for details       [facility] Medicine Services  HISTORY AND PHYSICAL     Date of Admission: 7/11/2021  Primary Care Physician: Omar Muhammad MD     Subjective      Chief Complaint: Covid-19 pneumonia.  Metabolic alkalosis.     History of Present Illness  Patient is a 35-year-old black female presented to ER with with Covid symptoms started on 7/1/2021 with cough/chills/fever/nausea/vomiting/diarrhea.  Patient was diagnosed with Covid positive on 7/3/2021.  Patient was also evaluated in ER on 7/6 with benign work-up at that time.  Since then patient has not eat and drink that much.  Except some water.  Laboratory shows alkalosis.  Patient was screaming tach dyspnea upon arrival.  Patient was put on 2 L of oxygen.  Patient has finished course of antibiotics and steroids from previous visit to the ER.  Patient unable to hold anything down.  Patient however is 105.  Labs shows low sodium and low potassium level.  With renal insufficiency.     Review of Systems   Constitutional: Positive for activity change, appetite change and fatigue. Negative for chills and fever.   HENT: Negative for hearing loss, nosebleeds, tinnitus and trouble swallowing.    Eyes: Negative for visual disturbance.   Respiratory: Positive for cough and shortness of breath. Negative for chest tightness and wheezing.    Cardiovascular: Negative for chest pain, palpitations and leg swelling.   Gastrointestinal: Positive for nausea and vomiting. Negative for abdominal distention, abdominal pain, blood in stool, constipation and diarrhea.   Endocrine: Negative for cold intolerance, heat intolerance, polydipsia, polyphagia and polyuria.   Genitourinary: Negative for decreased urine volume, difficulty urinating, dysuria, flank pain, frequency and hematuria.   Musculoskeletal: Positive for arthralgias. Negative for joint swelling and  myalgias.   Skin: Negative for rash.   Allergic/Immunologic: Negative for immunocompromised state.   Neurological: Positive for weakness. Negative for dizziness, syncope, light-headedness and headaches.   Hematological: Negative for adenopathy. Does not bruise/bleed easily.   Psychiatric/Behavioral: Negative for confusion and sleep disturbance. The patient is not nervous/anxious.                   Past Medical History:   Medical History   History reviewed. No pertinent past medical history.     hours)      Procedure Component Value Units Date/Time     Urinalysis, Microscopic Only - Urine, Clean Catch [[id number]]  (Abnormal) Collected: 07/11/21 0810     Specimen: Urine, Clean Catch Updated: 07/11/21 0855       RBC, UA 0-2 /HPF         WBC, UA 0-2 /HPF         Bacteria, UA None Seen /HPF         Squamous Epithelial Cells, UA 3-6 /HPF         Hyaline Casts, UA 0-2 /LPF         Methodology Automated Microscopy     Urinalysis With Culture If Indicated - Urine, Clean Catch [[id number]]  (Abnormal) Collected: 07/11/21 0810     Specimen: Urine, Clean Catch Updated: 07/11/21 0855       Color, UA Yellow       Appearance, UA Clear       pH, UA 7.0       Specific Gravity, UA 1.006       Glucose, UA Negative       Ketones, UA Negative       Bilirubin, UA Negative       Blood, UA Small (1+)       Protein, UA Negative       Leuk Esterase, UA Negative       Nitrite, UA Negative       Urobilinogen, UA 0.2 E.U./dL     D-dimer, Quantitative [[id number]]  (Abnormal) Collected: 07/11/21 0633     Specimen: Blood Updated: 07/11/21 0750       D-Dimer, Quantitative 0.53 mg/L (FEU)       Narrative:       Reference Range is 0-0.50 mg/L FEU     decreased if patient taking Biotin.         Comprehensive Metabolic Panel [[id number]]  (Abnormal) Collected: 07/11/21 0633     Specimen: Blood Updated: 07/11/21 0711       Glucose 87 mg/dL         BUN 19 mg/dL         Creatinine 1.27 mg/dL         Sodium 129 mmol/L         Potassium 3.2 mmol/L          Chloride 94 mmol/L         CO2 17.0 mmol/L         Calcium 9.0 mg/dL         Total Protein 8.1 g/dL         Albumin 4.20 g/dL         ALT (SGPT) 22 U/L         AST (SGOT) 32 U/L         Alkaline Phosphatase 37 U/L         Total Bilirubin 0.5 mg/dL         eGFR  African Amer 58 mL/min/1.73         Globulin 3.9 gm/dL         A/G Ratio 1.1 g/dL         BUN/Creatinine Ratio 15.0       Anion Gap 18.0 mmol/L       Narrative:       GFR Normal >60     Abnormal) Collected: 07/11/21 0633      Specimen: Blood Updated: 07/11/21 0711       Lipase 62 U/L       Troponin [[id number]]  (Normal) Collected: 07/11/21 0633     Specimen: Blood Updated: 07/11/21 0711       Troponin T <0.010 ng/mL       Narrative:       Troponin T Reference Range:  <= 0.03 ng/mL-   Negative for AMI  >0.03 ng/mL-     Abnormal for myocardial necrosis.  Clinicians would have to utilize clinical acumen, EKG, Troponin and serial changes to determine if it is an Acute Myocardial Infarction or myocardial injury due to an underlying chronic condition.         Results may be falsely decreased if patient taking Biotin.        CK [[id number]]  (Abnormal) Collected: 07/11/21 0633     Specimen: Blood Updated: 07/11/21 0711       Creatine Kinase 223 U/L       Phosphorus [[id number]]  (Abnormal) Collected: 07/11/21 0633     Specimen: Blood Updated: 07/11/21 0711       Phosphorus 2.0 mg/dL       Magnesium [[id number]]  (Normal) Collected: 07/11/21 0633     Specimen: Blood Updated: 07/11/21 0711       Magnesium 2.2 mg/dL       CBC & Differential [[id number]]  (Abnormal) Collected: 07/11/21 0633     Specimen: Blood Updated: 07/11/21 0654     Narrative:       The following orders were created for panel order CBC & Differential.  Procedure                               Abnormality         Status                     ---------                               -----------         ------                     CBC Auto Differential[[id number]]        Abnormal            Final result                   Please view results for these tests on the individual orders.     CBC Auto Differential [[id number]]  (Abnormal) Collected: 07/11/21 0633     Specimen: Blood Updated: 07/11/21 0654       WBC 9.80 10*3/mm3         RBC 5.50 10*6/mm3         Hemoglobin 15.3 g/dL         Hematocrit 41.9 %         MCV 76.2 fL         MCH 27.8 pg         MCHC 36.5 g/dL         RDW 13.7 %         RDW-SD 37.4 fl         MPV 9.7 fL         Platelets 291 10*3/mm3         Neutrophil % 82.8 %         Lymphocyte % 10.2 %         Monocyte % 5.2 %         Eosinophil % 0.0 %         Basophil % 0.4 %         Immature Grans % 1.4 %         Neutrophils, Absolute 8.11 10*3/mm3         Lymphocytes, Absolute 1.00 10*3/mm3         Monocytes, Absolute 0.51 10*3/mm3         Eosinophils, Absolute 0.00 10*3/mm3         Basophils, Absolute 0.04 10*3/mm3         Immature Grans, Absolute 0.14 10*3/mm3         nRBC 0.0 /100 WBC       Blood Gas, Arterial - [[id number]]  (Abnormal) Collected: 07/11/21 0626     Specimen: Arterial Blood Updated: 07/11/21 0629       Site Right Radial       Ra's Test Positive       pH, Arterial 7.539 pH units         Comment: 83 Value above reference range          pCO2, Arterial 20.4 mm Hg         Comment: 84 Value below reference range          pO2, Arterial 67.9 mm Hg         Comment: 84 Value below reference range          HCO3, Arterial 17.4 mmol/L         Comment: 84 Value below reference range          Base Excess, Arterial -2.7 mmol/L         Comment: 84 Value below reference range          O2 Saturation, Arterial 95.7 %         Temperature 37.0 C         Barometric Pressure for Blood Gas 746 mmHg         Modality Room Air       Ventilator Mode NA       Collected by [id number]       Comment: Meter: [id number]     :  [id number]          pCO2, Temperature Corrected 20.4 mm Hg         pH, Temp Corrected 7.539 pH Units         pO2, Temperature Corrected 67.9 mm Hg               Radiology Data:             Imaging  Results (Last 24 Hours)      Procedure Component Value Units Date/Time     XR Chest 1 View [[id number]] Collected: 07/11/21 0801       Updated: 07/11/21 0804     Narrative:       EXAMINATION: XR CHEST 1 VW-     7/11/2021 6:13 AM CDT     HISTORY: Shortness of breath.     1 view chest x-ray compared with July 6, 2021.     The heart and mediastinum are within normal limits.     Slightly lower lung volumes with mild interstitial infiltrate now  present in both lower lobes.     No pneumothorax.     Summary:  1. Mild interstitial infiltrate now present in both lower lobes.     This report was finalized on 07/11/2021 08:01 by Dr. Ben Connell MD.             I have personally reviewed and interpreted the radiology studies and ECG obtained at time of admission.      Assessment / Plan      Assessment & Plan       Active Hospital Problems     Diagnosis     • Pneumonia due to COVID-19 virus     • Nausea & vomiting           Plan.  Covid pneumonia/tachypnea.  Patient is requiring 2 L oxygen upon arrival.  Oxygen was took off by the night staff.  Patient's currently on 1.5 L oxygen..  Respiratory alkalosis.  C-reactive protein pending.  Plan.  Covid pneumonia/tachypnea.  Patient is requiring 2 L oxygen upon arrival.  Oxygen was took off by the night staff.  Patient's currently on 1.5 L oxygen..  Respiratory alkalosis.  C-reactive protein pending.  Pregnancy test pending.  Will start Covid pneumonia protocol.  Vitamin D.  Vitamin C.  Zinc.  Melatonin at night.  Lipitor.  Lovenox prophylaxis.  Patient just finished a course of steroids and antibiotics outpatient.  We will hold off steroids since the patient was not hypoxic even before the oxygen was put on.  Start patient remdesivir.  Chest x-ray-Mild interstitial infiltrate now present in both lower lobes.     Hyponatremia.  Slow IV fluid replacement.     Hypokalemia.  P.o. potassium.  Magnesium level-normal.     Acute renal injury.  Slow IV hydration.      Nausea/vomiting.   Pepcid.  Zofran.     Code Status: Full code.  If patient able to make medical session patient's mom Funmi Morejon would make it for her.     I discussed the patient's findings and my recommendations with: Patient.     Estimated length of stay: 2 to 6 days     Electronically signed by Arben Olivier MD, 07/11/21, 9:12 AM CDT.         This patient is 35 years old with underlying renal insufficiency with hyponatremia currently has acute respiratory alkalosis underlying metabolic alkalosis and metabolic acidosis part of that is hyperventilation secondary to her hypoxia along with the fact that she is vomiting and has got some renal insufficiency.  This is the patient's third visit to the hospital her prior CAT scan of the chest done recently has been negative D-dimer is negative today.  I have discussed this case with the hospitalist service they are going to review the case.  Patient is still nauseous she has not vomited over the past hour and a half.  But appears sick.  She is not persistently requiring oxygen she states 7 she moves from the bed even to go to the bathroom she starts getting short of breath.  Not obtain ambulatory pulse oximeter on her as yet.  Her chest x-ray shows worsening pulmonary infiltrates that along with her anion gap metabolic acidosis    [TS]         Component   Ref Range & Units 02:42 1 d ago   LDH   135 - 214 U/L 369High   384High     Resulting Agency                 Component   Ref Range & Units 02:42   Fibrinogen   240 - 460 mg/dL 559High     Resulting             Exam: CT angiogram of the of the chest with intravenous contrast.     CLINICAL HISTORY:  Shortness of breath. Pulmonary embolus suspected.  Covid positive.     DOSE:  159 mGycm. Automated exposure control was utilized to diminish  patient radiation dose.     TECHNIQUE: Contiguous axial images were obtained through the thorax  following intravenous contrast administration with reformatted images  obtained in the sagittal and  coronal projections from the original data  set. Three-dimensional reconstructions are also obtained.     COMPARISON:  None available     FINDINGS:     Pulmonary arteries:  There is normal enhancement of the pulmonary  arteries with no evidence of pulmonary thromboembolic disease.     Aorta:  The thoracic aorta and proximal great vessels are normal in  appearance. There is no evidence of aortic dissection or aneurysm.     LUNGS:  Multifocal peripheral interstitial infiltrates are present quite  typical in appearance for Covid pneumonia. No evidence of lobar  consolidation or effusion. Mild bibasilar atelectasis is present. There  is evidence of remote granulomatous disease.     Pleural spaces: Unremarkable. No evidence of pleural effusion or  pneumothorax.     HEART: No evidence of enlargement. No coronary artery calcifications are  present. There is no evidence of pericardial effusion. No evidence of RV  dysfunction.     Bones: No acute osseous abnormalities are demonstrated.     CHEST WALL: No chest wall abnormalities are demonstrated.     Lymph nodes: No enlarged mediastinal or axillary lymphadenopathy is  present.     Upper abdomen: Limited images of the upper abdomen are unremarkable.     IMPRESSION:  1. No evidence of pulmonary thromboembolic disease. The thoracic aorta  is normal in caliber with no evidence of dissection or aneurysm.  2. Multifocal peripheral interstitial infiltrates quite typical in  distribution and appearance for Covid pneumonia.  This report was finalized on 07/07/2021 06:43 by Dr. Patrice Hawkins MD.   Imaging    CT Angiogram Chest (Order: [id number]) - 7/6/2021  Result History    CT Angiogram       /21 2311  100.7 (38.2)Abnormal   110  20  120/80       07/11/ 07/11/21 2008  100.1 (37.8)  115  22  125/82  room air  98   07/11/21 1834  99.1 (37.3)  124Abnormal   --  137/92  --  96   07                     Current Facility-Administered Medications   Medication Dose Route  Frequency Provider Last Rate Last Admin   • acetaminophen (TYLENOL) tablet 650 mg  650 mg Oral Q4H PRN Arben Olivier MD   650 mg at 07/12/21 0527    Or   • acetaminophen (TYLENOL) suppository 650 mg  650 mg Rectal Q4H PRN Arben Olivier MD       • albuterol sulfate HFA (PROVENTIL HFA;VENTOLIN HFA;PROAIR HFA) inhaler 2 puff  2 puff Inhalation Q6H PRN Arben Olivier MD       • ascorbic acid (VITAMIN C) tablet 500 mg  500 mg Oral Daily Arben Olivier MD   500 mg at 07/12/21 0820   • benzonatate (TESSALON) capsule 100 mg  100 mg Oral TID PRN Arben Olivier MD       • cholecalciferol (VITAMIN D3) tablet 2,000 Units  2,000 Units Oral Daily Arben Olivier MD   2,000 Units at 07/12/21 0820   • dextromethorphan polistirex ER (DELSYM) 30 MG/5ML oral suspension 60 mg  60 mg Oral Q12H PRN Arben Olivier MD       • enoxaparin (LOVENOX) syringe 40 mg  40 mg Subcutaneous Q24H Arben Olivier MD   40 mg at 07/11/21 1834   • famotidine (PEPCID) injection 20 mg  20 mg Intravenous BID Arben Olivier MD   20 mg at 07/12/21 0820   • ipratropium-albuterol (DUO-NEB) nebulizer solution 3 mL  3 mL Nebulization Q6H PRN Arben Olivier MD       • lactated ringers infusion  75 mL/hr Intravenous Continuous Arben Olivier MD 75 mL/hr at 07/11/21 1258 75 mL/hr at 07/11/21 1258   • loperamide (IMODIUM) capsule 2 mg  2 mg Oral Q6H PRN Edwin Jovel MD   2 mg at 07/12/21 0120   • melatonin tablet 3 mg  3 mg Oral Nightly Arben Olivier MD   3 mg at 07/11/21 2112   • ondansetron (ZOFRAN) injection 4 mg  4 mg Intravenous Q6H PRN Arben Olivier MD   4 mg at 07/11/21 2322   • Pharmacy Consult - Remdesivir   Does not apply Continuous PRN Arben Olivier MD       • remdesivir 100 mg in sodium chloride 0.9 % 270 mL IVPB (powder vial)  100 mg Intravenous Q24H rAben Olivier MD       • thiamine (VITAMIN B-1) tablet 100 mg  100 mg Oral Daily Arben Olivier MD   100 mg at 07/12/21 0820   • zinc sulfate (ZINCATE) capsule 220 mg  220 mg Oral  Daily Arben Olivier MD   220 mg at 07/12/21 0820

## 2021-07-13 LAB
ALBUMIN SERPL-MCNC: 3.1 G/DL (ref 3.5–5.2)
ALBUMIN/GLOB SERPL: 1 G/DL
ALP SERPL-CCNC: 29 U/L (ref 39–117)
ALT SERPL W P-5'-P-CCNC: 21 U/L (ref 1–33)
ANION GAP SERPL CALCULATED.3IONS-SCNC: 14 MMOL/L (ref 5–15)
ANISOCYTOSIS BLD QL: ABNORMAL
AST SERPL-CCNC: 33 U/L (ref 1–32)
BILIRUB SERPL-MCNC: 0.3 MG/DL (ref 0–1.2)
BUN SERPL-MCNC: 11 MG/DL (ref 6–20)
BUN/CREAT SERPL: 12.8 (ref 7–25)
CALCIUM SPEC-SCNC: 8.1 MG/DL (ref 8.6–10.5)
CHLORIDE SERPL-SCNC: 104 MMOL/L (ref 98–107)
CO2 SERPL-SCNC: 17 MMOL/L (ref 22–29)
CREAT SERPL-MCNC: 0.86 MG/DL (ref 0.57–1)
CRP SERPL-MCNC: 13.3 MG/DL (ref 0–0.5)
DEPRECATED RDW RBC AUTO: 37.6 FL (ref 37–54)
ERYTHROCYTE [DISTWIDTH] IN BLOOD BY AUTOMATED COUNT: 13.5 % (ref 12.3–15.4)
FERRITIN SERPL-MCNC: 1679 NG/ML (ref 13–150)
GFR SERPL CREATININE-BSD FRML MDRD: 92 ML/MIN/1.73
GLOBULIN UR ELPH-MCNC: 3 GM/DL
GLUCOSE SERPL-MCNC: 73 MG/DL (ref 65–99)
HCT VFR BLD AUTO: 32.4 % (ref 34–46.6)
HGB BLD-MCNC: 11.7 G/DL (ref 12–15.9)
LYMPHOCYTES # BLD MANUAL: 1.55 10*3/MM3 (ref 0.7–3.1)
LYMPHOCYTES NFR BLD MANUAL: 18.2 % (ref 19.6–45.3)
LYMPHOCYTES NFR BLD MANUAL: 7.1 % (ref 5–12)
MCH RBC QN AUTO: 27.9 PG (ref 26.6–33)
MCHC RBC AUTO-ENTMCNC: 36.1 G/DL (ref 31.5–35.7)
MCV RBC AUTO: 77.1 FL (ref 79–97)
MICROCYTES BLD QL: ABNORMAL
MONOCYTES # BLD AUTO: 0.61 10*3/MM3 (ref 0.1–0.9)
NEUTROPHILS # BLD AUTO: 6.38 10*3/MM3 (ref 1.7–7)
NEUTROPHILS NFR BLD MANUAL: 72.7 % (ref 42.7–76)
NEUTS BAND NFR BLD MANUAL: 2 % (ref 0–5)
PLAT MORPH BLD: NORMAL
PLATELET # BLD AUTO: 297 10*3/MM3 (ref 140–450)
PMV BLD AUTO: 9.9 FL (ref 6–12)
POIKILOCYTOSIS BLD QL SMEAR: ABNORMAL
POLYCHROMASIA BLD QL SMEAR: ABNORMAL
POTASSIUM SERPL-SCNC: 3.3 MMOL/L (ref 3.5–5.2)
PROT SERPL-MCNC: 6.1 G/DL (ref 6–8.5)
RBC # BLD AUTO: 4.2 10*6/MM3 (ref 3.77–5.28)
SODIUM SERPL-SCNC: 135 MMOL/L (ref 136–145)
TARGETS BLD QL SMEAR: ABNORMAL
WBC # BLD AUTO: 8.54 10*3/MM3 (ref 3.4–10.8)
WBC MORPH BLD: NORMAL

## 2021-07-13 PROCEDURE — 25010000002 ENOXAPARIN PER 10 MG: Performed by: FAMILY MEDICINE

## 2021-07-13 PROCEDURE — 80053 COMPREHEN METABOLIC PANEL: CPT | Performed by: FAMILY MEDICINE

## 2021-07-13 PROCEDURE — 94640 AIRWAY INHALATION TREATMENT: CPT

## 2021-07-13 PROCEDURE — 36415 COLL VENOUS BLD VENIPUNCTURE: CPT | Performed by: INTERNAL MEDICINE

## 2021-07-13 PROCEDURE — 85007 BL SMEAR W/DIFF WBC COUNT: CPT | Performed by: FAMILY MEDICINE

## 2021-07-13 PROCEDURE — 86140 C-REACTIVE PROTEIN: CPT | Performed by: INTERNAL MEDICINE

## 2021-07-13 PROCEDURE — 25810000003 SODIUM CHLORIDE 0.9 % WITH KCL 20 MEQ 20-0.9 MEQ/L-% SOLUTION: Performed by: INTERNAL MEDICINE

## 2021-07-13 PROCEDURE — 82728 ASSAY OF FERRITIN: CPT | Performed by: INTERNAL MEDICINE

## 2021-07-13 PROCEDURE — 85025 COMPLETE CBC W/AUTO DIFF WBC: CPT | Performed by: FAMILY MEDICINE

## 2021-07-13 PROCEDURE — 25010000002 ONDANSETRON PER 1 MG: Performed by: FAMILY MEDICINE

## 2021-07-13 RX ORDER — FAMOTIDINE 20 MG/1
20 TABLET, FILM COATED ORAL
Status: DISCONTINUED | OUTPATIENT
Start: 2021-07-13 | End: 2021-07-15 | Stop reason: HOSPADM

## 2021-07-13 RX ORDER — POTASSIUM CHLORIDE 750 MG/1
40 CAPSULE, EXTENDED RELEASE ORAL ONCE
Status: COMPLETED | OUTPATIENT
Start: 2021-07-13 | End: 2021-07-13

## 2021-07-13 RX ADMIN — Medication 2000 UNITS: at 09:27

## 2021-07-13 RX ADMIN — ONDANSETRON 4 MG: 2 INJECTION INTRAMUSCULAR; INTRAVENOUS at 13:38

## 2021-07-13 RX ADMIN — THIAMINE HCL TAB 100 MG 100 MG: 100 TAB at 09:27

## 2021-07-13 RX ADMIN — GUAIFENESIN 1200 MG: 600 TABLET, EXTENDED RELEASE ORAL at 20:20

## 2021-07-13 RX ADMIN — POTASSIUM CHLORIDE 40 MEQ: 750 CAPSULE, EXTENDED RELEASE ORAL at 09:27

## 2021-07-13 RX ADMIN — OXYCODONE HYDROCHLORIDE AND ACETAMINOPHEN 500 MG: 500 TABLET ORAL at 09:27

## 2021-07-13 RX ADMIN — ACETAMINOPHEN 650 MG: 325 TABLET, FILM COATED ORAL at 23:31

## 2021-07-13 RX ADMIN — ACETAMINOPHEN 650 MG: 325 TABLET, FILM COATED ORAL at 17:45

## 2021-07-13 RX ADMIN — Medication 3 MG: at 20:20

## 2021-07-13 RX ADMIN — ACETAMINOPHEN 650 MG: 325 TABLET, FILM COATED ORAL at 02:43

## 2021-07-13 RX ADMIN — ENOXAPARIN SODIUM 40 MG: 40 INJECTION SUBCUTANEOUS at 17:35

## 2021-07-13 RX ADMIN — REMDESIVIR 100 MG: 100 INJECTION, POWDER, LYOPHILIZED, FOR SOLUTION INTRAVENOUS at 17:36

## 2021-07-13 RX ADMIN — FAMOTIDINE 20 MG: 20 TABLET, FILM COATED ORAL at 17:35

## 2021-07-13 RX ADMIN — POTASSIUM CHLORIDE AND SODIUM CHLORIDE 75 ML/HR: 900; 150 INJECTION, SOLUTION INTRAVENOUS at 02:43

## 2021-07-13 RX ADMIN — DEXTROMETHORPHAN 60 MG: 30 SUSPENSION, EXTENDED RELEASE ORAL at 16:11

## 2021-07-13 RX ADMIN — GUAIFENESIN 1200 MG: 600 TABLET, EXTENDED RELEASE ORAL at 09:27

## 2021-07-13 RX ADMIN — FAMOTIDINE 20 MG: 10 INJECTION, SOLUTION INTRAVENOUS at 09:27

## 2021-07-13 RX ADMIN — ALBUTEROL SULFATE 2 PUFF: 90 AEROSOL, METERED RESPIRATORY (INHALATION) at 10:29

## 2021-07-13 RX ADMIN — ZINC SULFATE 220 MG (50 MG) CAPSULE 220 MG: CAPSULE at 09:27

## 2021-07-13 NOTE — PLAN OF CARE
Goal Outcome Evaluation:  Plan of Care Reviewed With: patient        Progress: no change  Outcome Summary: pt complained of headache this shift. medication given as needed. VSS. s/st . minimal coughing spells this shift. pt having trouble resting throughout the night. room air. alert and oriented. up ad oscar with occasional weakness. no falls. safety maintained

## 2021-07-13 NOTE — PROGRESS NOTES
Elena Glaser is a 35 y.o. female who qualifies for IV to PO therapy conversion.    Pepcid has been changed from IV to PO per System Policy.       SATYA Austin RP

## 2021-07-13 NOTE — PLAN OF CARE
Goal Outcome Evaluation:  Plan of Care Reviewed With: patient        Progress: no change  Outcome Summary: Pt c/o nausea/vomiting this shift. Vitals stable. Medicated for nausea with some relief. Diarrhea has slowed. IV fluids continue. Safety maintained. SR-ST

## 2021-07-13 NOTE — PROGRESS NOTES
[facility] Medicine Services  INPATIENT PROGRESS NOTE    Patient Name: Elena Glaser  Date of Admission: 7/11/2021  Today's Date: 07/13/21  Length of Stay: 2  Primary Care Physician: Omar Muhammad MD    Subjective   Chief Complaint: Fatigue.   HPI   Today is not as good of a day as yesterday.  She has had nausea return.  She vomited earlier.  She is fatigued.  She would like to take a shower.  She does not feel like she is ready to go home today.  I agree.  I would like for her to have less symptoms and be tolerating diet prior to discharge.    She continues to not have any problems with hypoxia.  Her heart rate has not been an issue.  She has not had a fever in nearly 36 hours.    We will reevaluate on a daily basis for discharge.    Her inflammatory markers do remain elevated.    Review of Systems   All pertinent negatives and positives are as above. All other systems have been reviewed and are negative unless otherwise stated.     Objective    Temp:  [97.5 °F (36.4 °C)-99 °F (37.2 °C)] 97.5 °F (36.4 °C)  Heart Rate:  [] 109  Resp:  [18-20] 18  BP: (105-121)/(65-80) 105/80  Physical Exam  Constitutional:       Appearance: She is well-developed.      Comments: Up in bed.  No distress.  Watching television.  Discussed with her nurse, Cara    HENT:      Head: Normocephalic and atraumatic.   Eyes:      Conjunctiva/sclera: Conjunctivae normal.      Pupils: Pupils are equal, round, and reactive to light.   Neck:      Vascular: No JVD.   Cardiovascular:      Rate and Rhythm: Normal rate and regular rhythm.      Heart sounds: Normal heart sounds. No murmur heard.   No friction rub. No gallop.    Pulmonary:      Effort: Pulmonary effort is normal. No respiratory distress.      Breath sounds: Normal breath sounds. No wheezing or rales.      Comments: On room air with no respiratory distress.  Chest:      Chest wall: No tenderness.   Abdominal:      General: Bowel sounds are  normal. There is no distension.      Palpations: Abdomen is soft.      Tenderness: There is no abdominal tenderness. There is no guarding or rebound.   Musculoskeletal:         General: No tenderness or deformity. Normal range of motion.      Cervical back: Neck supple.   Skin:     General: Skin is warm and dry.      Findings: No rash.   Neurological:      Mental Status: She is alert and oriented to person, place, and time.      Cranial Nerves: No cranial nerve deficit.      Motor: No abnormal muscle tone.      Deep Tendon Reflexes: Reflexes normal.   Psychiatric:         Behavior: Behavior normal.         Thought Content: Thought content normal.         Judgment: Judgment normal.       Results Review:  I have reviewed the labs, radiology results, and diagnostic studies.    Laboratory Data:   Results from last 7 days   Lab Units 07/13/21 0423 07/12/21 0242 07/11/21 0633   WBC 10*3/mm3 8.54 7.27 9.80   HEMOGLOBIN g/dL 11.7* 12.8 15.3   HEMATOCRIT % 32.4* 34.8 41.9   PLATELETS 10*3/mm3 297 285 291     Results from last 7 days   Lab Units 07/13/21 0423 07/12/21 0242 07/11/21 0633   SODIUM mmol/L 135* 135* 129*   POTASSIUM mmol/L 3.3* 3.3* 3.2*   CHLORIDE mmol/L 104 100 94*   CO2 mmol/L 17.0* 22.0 17.0*   BUN mg/dL 11 10 19   CREATININE mg/dL 0.86 1.06* 1.27*   CALCIUM mg/dL 8.1* 8.8 9.0   BILIRUBIN mg/dL 0.3 0.3 0.5   ALK PHOS U/L 29* 32* 37*   ALT (SGPT) U/L 21 21 22   AST (SGOT) U/L 33* 30 32   GLUCOSE mg/dL 73 96 87     COVID LABS:  Results From Last 14 Days   Lab Units 07/13/21 0423 07/12/21 0242 07/11/21 0633 07/06/21 2009   PROBNP pg/mL  --   --  <5.0  --    CK TOTAL U/L  --   --  223*  --    CRP mg/dL 13.30*  --  10.26*  --    D DIMER QUANT mg/L (FEU)  --  0.51* 0.53* 0.53*   FERRITIN ng/mL 1,679.00*  --  1,450.00*  --    LDH U/L  --  369* 384*  --    PROCALCITONIN ng/mL  --  0.19 0.16  --    TROPONIN T ng/mL  --   --  <0.010  --      Culture Data:   Blood Culture   Date Value Ref Range Status      07/11/2021 No growth at less than 24 hours  Preliminary   07/11/2021 No growth at less than 24 hours  Preliminary   07/06/2021 No growth at 5 days  Final   07/06/2021 No growth at 5 days  Final     I have reviewed the patient's current medications.     Assessment/Plan     Active Hospital Problems    Diagnosis    • **Pneumonia due to COVID-19 virus    • Viral sepsis (CMS/HCC)    • Nausea, vomiting, and diarrhea    • Hyponatremia    • Hypokalemia    • Obesity (BMI 30-39.9)      Plan:  The patient was admitted on 7/11 by Dr. Olivier.  She has known COVID-19 infection.  She first tested positive for Covid on 7/3 at urgent care.  She was reevaluated in the ER on 7/6 for ongoing symptoms.  She returned to the emergency department on 7/11 with persistent nausea and vomiting.  She was found to have an ASUNCION and a serum sodium of 129.  She was referred for admission by the emergency department.    She has most of the classic symptoms for COVID-19.  However, 1 seems to be most pronounced is the GI issues.  She has been having quite a bit of diarrhea accompanied by nausea and vomiting.  She has had a difficult time with intake.  She was found to have an ASUNCION and to be hyponatremic.  Her ASUNCION and hyponatremia have resolved.  Replacing potassium in fluids.     She has multifocal peripheral interstitial infiltrates quite atypical in distribution and appearance for COVID-19 pneumonia.  However, she has not been hypoxic.  She has no pulmonary thromboembolic disease.  She has had a cough.  She has not been short of breath.  She has never required oxygen.    Dr. Olivier started her on remdesivir with no overt hypoxemia.  This medication is not necessarily indicated at this point in time for this patient.  However, she was initiated and will continue for now.  Steroids were not initiated by Dr. Olivier and there is currently no indication to do so.  Continue adjunctive treatments.    She displayed signs of viral sepsis.  Her sinus  tachycardia has improved significantly since presentation.  Her blood pressure is stable.  She remains on room air.    Reconciled and resumed home medications as appropriate.  Oral contraceptive and Maxide to be on hold at present.    Lovenox for DVT prophylaxis.    Discharge Planning: I expect the patient to be discharged to home in 1-2 days.    Electronically signed by Marc Merlos DO, 07/13/21, 14:33 CDT.

## 2021-07-14 PROBLEM — R74.01 TRANSAMINITIS: Status: ACTIVE | Noted: 2021-07-14

## 2021-07-14 LAB
ALBUMIN SERPL-MCNC: 3 G/DL (ref 3.5–5.2)
ALBUMIN/GLOB SERPL: 1.1 G/DL
ALP SERPL-CCNC: 36 U/L (ref 39–117)
ALT SERPL W P-5'-P-CCNC: 52 U/L (ref 1–33)
ANION GAP SERPL CALCULATED.3IONS-SCNC: 11 MMOL/L (ref 5–15)
AST SERPL-CCNC: 95 U/L (ref 1–32)
BILIRUB SERPL-MCNC: 0.4 MG/DL (ref 0–1.2)
BUN SERPL-MCNC: 11 MG/DL (ref 6–20)
BUN/CREAT SERPL: 12.8 (ref 7–25)
CALCIUM SPEC-SCNC: 7.9 MG/DL (ref 8.6–10.5)
CHLORIDE SERPL-SCNC: 108 MMOL/L (ref 98–107)
CO2 SERPL-SCNC: 17 MMOL/L (ref 22–29)
CREAT SERPL-MCNC: 0.86 MG/DL (ref 0.57–1)
CRP SERPL-MCNC: 15.91 MG/DL (ref 0–0.5)
FERRITIN SERPL-MCNC: 2444 NG/ML (ref 13–150)
GFR SERPL CREATININE-BSD FRML MDRD: 92 ML/MIN/1.73
GLOBULIN UR ELPH-MCNC: 2.8 GM/DL
GLUCOSE SERPL-MCNC: 76 MG/DL (ref 65–99)
LIPASE SERPL-CCNC: 75 U/L (ref 13–60)
POTASSIUM SERPL-SCNC: 3.9 MMOL/L (ref 3.5–5.2)
PROT SERPL-MCNC: 5.8 G/DL (ref 6–8.5)
SODIUM SERPL-SCNC: 136 MMOL/L (ref 136–145)

## 2021-07-14 PROCEDURE — 82728 ASSAY OF FERRITIN: CPT | Performed by: INTERNAL MEDICINE

## 2021-07-14 PROCEDURE — 25810000003 SODIUM CHLORIDE 0.9 % WITH KCL 20 MEQ 20-0.9 MEQ/L-% SOLUTION: Performed by: INTERNAL MEDICINE

## 2021-07-14 PROCEDURE — 25010000002 ONDANSETRON PER 1 MG: Performed by: INTERNAL MEDICINE

## 2021-07-14 PROCEDURE — 25010000002 PROCHLORPERAZINE 10 MG/2ML SOLUTION: Performed by: INTERNAL MEDICINE

## 2021-07-14 PROCEDURE — 80053 COMPREHEN METABOLIC PANEL: CPT | Performed by: FAMILY MEDICINE

## 2021-07-14 PROCEDURE — 25010000002 ONDANSETRON PER 1 MG: Performed by: FAMILY MEDICINE

## 2021-07-14 PROCEDURE — 83690 ASSAY OF LIPASE: CPT | Performed by: INTERNAL MEDICINE

## 2021-07-14 PROCEDURE — 25010000002 ENOXAPARIN PER 10 MG: Performed by: FAMILY MEDICINE

## 2021-07-14 PROCEDURE — 36415 COLL VENOUS BLD VENIPUNCTURE: CPT | Performed by: INTERNAL MEDICINE

## 2021-07-14 PROCEDURE — 94799 UNLISTED PULMONARY SVC/PX: CPT

## 2021-07-14 PROCEDURE — 86140 C-REACTIVE PROTEIN: CPT | Performed by: INTERNAL MEDICINE

## 2021-07-14 RX ORDER — PROCHLORPERAZINE EDISYLATE 5 MG/ML
5 INJECTION INTRAMUSCULAR; INTRAVENOUS EVERY 6 HOURS PRN
Status: DISCONTINUED | OUTPATIENT
Start: 2021-07-14 | End: 2021-07-15 | Stop reason: HOSPADM

## 2021-07-14 RX ORDER — ONDANSETRON HCL IN 0.9 % NACL 8 MG/50 ML
8 INTRAVENOUS SOLUTION, PIGGYBACK (ML) INTRAVENOUS EVERY 6 HOURS PRN
Status: DISCONTINUED | OUTPATIENT
Start: 2021-07-14 | End: 2021-07-15 | Stop reason: HOSPADM

## 2021-07-14 RX ADMIN — ACETAMINOPHEN 650 MG: 325 TABLET, FILM COATED ORAL at 22:15

## 2021-07-14 RX ADMIN — Medication 3 MG: at 21:02

## 2021-07-14 RX ADMIN — POTASSIUM CHLORIDE AND SODIUM CHLORIDE 60 ML/HR: 900; 150 INJECTION, SOLUTION INTRAVENOUS at 05:22

## 2021-07-14 RX ADMIN — ACETAMINOPHEN 650 MG: 325 TABLET, FILM COATED ORAL at 16:14

## 2021-07-14 RX ADMIN — GUAIFENESIN 1200 MG: 600 TABLET, EXTENDED RELEASE ORAL at 08:51

## 2021-07-14 RX ADMIN — FAMOTIDINE 20 MG: 20 TABLET, FILM COATED ORAL at 16:14

## 2021-07-14 RX ADMIN — DEXTROMETHORPHAN 60 MG: 30 SUSPENSION, EXTENDED RELEASE ORAL at 18:04

## 2021-07-14 RX ADMIN — GUAIFENESIN 1200 MG: 600 TABLET, EXTENDED RELEASE ORAL at 21:02

## 2021-07-14 RX ADMIN — ZINC SULFATE 220 MG (50 MG) CAPSULE 220 MG: CAPSULE at 08:51

## 2021-07-14 RX ADMIN — OXYCODONE HYDROCHLORIDE AND ACETAMINOPHEN 500 MG: 500 TABLET ORAL at 08:51

## 2021-07-14 RX ADMIN — DEXTROMETHORPHAN 60 MG: 30 SUSPENSION, EXTENDED RELEASE ORAL at 05:22

## 2021-07-14 RX ADMIN — FAMOTIDINE 20 MG: 20 TABLET, FILM COATED ORAL at 18:39

## 2021-07-14 RX ADMIN — REMDESIVIR 100 MG: 100 INJECTION, POWDER, LYOPHILIZED, FOR SOLUTION INTRAVENOUS at 18:03

## 2021-07-14 RX ADMIN — PROCHLORPERAZINE EDISYLATE 5 MG: 5 INJECTION INTRAMUSCULAR; INTRAVENOUS at 11:52

## 2021-07-14 RX ADMIN — Medication 2000 UNITS: at 08:51

## 2021-07-14 RX ADMIN — ENOXAPARIN SODIUM 40 MG: 40 INJECTION SUBCUTANEOUS at 18:03

## 2021-07-14 RX ADMIN — THIAMINE HCL TAB 100 MG 100 MG: 100 TAB at 08:51

## 2021-07-14 RX ADMIN — ONDANSETRON 8 MG: 2 INJECTION INTRAMUSCULAR; INTRAVENOUS at 13:34

## 2021-07-14 RX ADMIN — FAMOTIDINE 20 MG: 20 TABLET, FILM COATED ORAL at 08:51

## 2021-07-14 RX ADMIN — ONDANSETRON 4 MG: 2 INJECTION INTRAMUSCULAR; INTRAVENOUS at 09:02

## 2021-07-14 NOTE — PAYOR COMM NOTE
"7/13 CLINICAL UPDATE  UR  [id number]     [id number]  Elena Glaser (35 y.o. Female)     Date of Birth Social Security Number Address Home Phone MRN    1985  [address]  [address]  [facility] [address] [phone] [id number]    Holiness Marital Status          Other Single       Admission Date Admission Type Admitting Provider Attending Provider Department, Room/Bed    7/11/21 Emergency Marc Merlos DO Hancock, John C, DO [facility] 4B, 443/1    Discharge Date Discharge Disposition Discharge Destination                       Attending Provider: Marc Merlos DO    Allergies: No Known Allergies    Isolation: Enh Drop/Con   Infection: COVID (confirmed) (07/03/21)   Code Status: CPR    Ht: 152.4 cm (60\")   Wt: 74.5 kg (164 lb 3 oz)    Admission Cmt: None   Principal Problem: Pneumonia due to COVID-19 virus [U07.1,J12.82]                 Active Insurance as of 7/11/2021     Primary Coverage     Payor Plan Insurance Group Employer/Plan Group    [employer] PPO [id number]     Payor Plan Address Payor Plan Phone Number Payor Plan Fax Number Effective Dates    [address] [phone]  1/1/2013 - None Entered    [facility] [id number]       Subscriber Name Subscriber Birth Date Member ID       ELENA GLASER 1985 [id number]                 Emergency Contacts      (Rel.) Home Phone Work Phone Mobile Phone    JUWAN RAMEY (Mother) [phone] -- --            Vital Signs (last day)     Date/Time   Temp   Temp src   Pulse   Resp   BP   Patient Position   SpO2    07/14/21 0526   98.2 (36.8)   Oral   --   --   --   --   --    07/14/21 0335   98.5 (36.9)   Oral   94   18   132/84   Lying   98    07/14/21 0009   --   --   92   16   --   Lying   96    07/13/21 2328   98.7 (37.1)   Oral   96   18   124/78   Lying   97    07/13/21 2028   98.8 (37.1)   Oral   106   20   122/74   Lying   98    07/13/21 1745   99.9 (37.7)   Oral   108   18  "  112/78   Lying   96    07/13/21 1236   97.5 (36.4)   Oral   109   18   105/80   Lying   --    07/13/21 1029   --   --   96   18   --   --   96    07/13/21 0850   98.8 (37.1)   Oral   109   20   120/69   Lying   --    07/13/21 0359   98.6 (37)   Oral   90   20   109/65   Lying   98    07/13/21 0002   98.1 (36.7)   Oral   99   20   118/67   Sitting   96              Oxygen Therapy (last day)     Date/Time   SpO2   Device (Oxygen Therapy)   Flow (L/min)   Oxygen Concentration (%)   ETCO2 (mmHg)    07/14/21 0335   98   room air   --   --   --    07/14/21 0009   96   room air   --   --   --    07/13/21 2328   97   room air   --   --   --    07/13/21 2028   98   room air   --   --   --    07/13/21 1745   96   room air   --   --   --    07/13/21 1236   --   room air   --   --   --    07/13/21 1029   96   room air   --   --   --    07/13/21 0850   --   room air   --   --   --    07/13/21 0800   --   room air   --   --   --    07/13/21 0359   98   room air   --   --   --    07/13/21 0002   96   room air   --   --   --                Current Facility-Administered Medications   Medication Dose Route Frequency Provider Last Rate Last Admin   • acetaminophen (TYLENOL) tablet 650 mg  650 mg Oral Q4H PRN Arben Olivier MD   650 mg at 07/13/21 2331    Or   • acetaminophen (TYLENOL) suppository 650 mg  650 mg Rectal Q4H PRN Arben Olivier MD       • albuterol sulfate HFA (PROVENTIL HFA;VENTOLIN HFA;PROAIR HFA) inhaler 2 puff  2 puff Inhalation Q6H PRN Arben Olivier MD   2 puff at 07/13/21 1029   • ascorbic acid (VITAMIN C) tablet 500 mg  500 mg Oral Daily Arben Olivier MD   500 mg at 07/13/21 0927   • benzonatate (TESSALON) capsule 100 mg  100 mg Oral TID PRN Arben Olivier MD       • cholecalciferol (VITAMIN D3) tablet 2,000 Units  2,000 Units Oral Daily Arben Olivier MD   2,000 Units at 07/13/21 0927   • dextromethorphan polistirex ER (DELSYM) 30 MG/5ML oral suspension 60 mg  60 mg Oral Q12H PRN Arben Olivier MD   60  mg at 07/14/21 0522   • enoxaparin (LOVENOX) syringe 40 mg  40 mg Subcutaneous Q24H Arben Olivier MD   40 mg at 07/13/21 1735   • famotidine (PEPCID) tablet 20 mg  20 mg Oral BID AC Marc Merlos DO   20 mg at 07/13/21 1735   • guaiFENesin (MUCINEX) 12 hr tablet 1,200 mg  1,200 mg Oral Q12H Marc Merlos DO   1,200 mg at 07/13/21 2020   • ipratropium-albuterol (DUO-NEB) nebulizer solution 3 mL  3 mL Nebulization Q6H PRN Arben Olivier MD       • loperamide (IMODIUM) capsule 2 mg  2 mg Oral Q6H PRN Edwin Jovel MD   2 mg at 07/12/21 0120   • melatonin tablet 3 mg  3 mg Oral Nightly Arben Olivier MD   3 mg at 07/13/21 2020   • ondansetron (ZOFRAN) injection 4 mg  4 mg Intravenous Q6H PRN Arben Olivier MD   4 mg at 07/13/21 1338   • Pharmacy Consult - Remdesivir   Does not apply Continuous PRN Arben Olivier MD       • remdesivir 100 mg in sodium chloride 0.9 % 270 mL IVPB (powder vial)  100 mg Intravenous Q24H Arben Olivier MD   100 mg at 07/13/21 1736   • sodium chloride 0.9 % with KCl 20 mEq/L infusion  60 mL/hr Intravenous Continuous Marc Merlos DO 60 mL/hr at 07/14/21 0522 60 mL/hr at 07/14/21 0522   • thiamine (VITAMIN B-1) tablet 100 mg  100 mg Oral Daily Arben Olivier MD   100 mg at 07/13/21 0927   • zinc sulfate (ZINCATE) capsule 220 mg  220 mg Oral Daily Arben Olivier MD   220 mg at 07/13/21 0927       Lab Results (last 24 hours)     Procedure Component Value Units Date/Time    Ferritin [[id number]]  (Abnormal) Collected: 07/14/21 0439    Specimen: Blood Updated: 07/14/21 0555     Ferritin 2,444.00 ng/mL     Narrative:      Results may be falsely decreased if patient taking Biotin.      Comprehensive Metabolic Panel [[id number]]  (Abnormal) Collected: 07/14/21 0439    Specimen: Blood Updated: 07/14/21 0534     Glucose 76 mg/dL      BUN 11 mg/dL      Creatinine 0.86 mg/dL      Sodium 136 mmol/L      Potassium 3.9 mmol/L      Chloride 108 mmol/L      CO2 17.0 mmol/L      Calcium 7.9  mg/dL      Total Protein 5.8 g/dL      Albumin 3.00 g/dL      ALT (SGPT) 52 U/L      AST (SGOT) 95 U/L      Alkaline Phosphatase 36 U/L      Total Bilirubin 0.4 mg/dL      eGFR  Urmila Hernandez 92 mL/min/1.73      Globulin 2.8 gm/dL      A/G Ratio 1.1 g/dL      BUN/Creatinine Ratio 12.8     Anion Gap 11.0 mmol/L     Narrative:      GFR Normal >60  Chronic Kidney Disease <60  Kidney Failure <15      C-reactive Protein [[id number]]  (Abnormal) Collected: 07/14/21 0439    Specimen: Blood Updated: 07/14/21 0534     C-Reactive Protein 15.91 mg/dL     Blood Culture With RHEA - Blood, Arm, Left [[id number]] Collected: 07/11/21 1842    Specimen: Blood from Arm, Left Updated: 07/13/21 1930     Blood Culture No growth at 2 days    Blood Culture With RHEA - Blood, Arm, Right [[id number]] Collected: 07/11/21 1842    Specimen: Blood from Arm, Right Updated: 07/13/21 1930     Blood Culture No growth at 2 days        Imaging Results (Last 24 Hours)     ** No results found for the last 24 hours. **        Orders (last 24 hrs)      Start     Ordered    07/14/21 0600  Ferritin  Morning Draw      07/13/21 1436    07/14/21 0600  C-reactive Protein  Morning Draw      07/13/21 1436    07/13/21 1730  famotidine (PEPCID) tablet 20 mg  2 Times Daily Before Meals      07/13/21 1217    07/13/21 1436  Diet Regular; GI Soft  Diet Effective Now      07/13/21 1435    07/13/21 1015  potassium chloride (MICRO-K) CR capsule 40 mEq  Once      07/13/21 0919    07/12/21 2100  guaiFENesin (MUCINEX) 12 hr tablet 1,200 mg  Every 12 Hours Scheduled      07/12/21 1801 07/12/21 2000  Incentive Spirometry  Every 2 Hours While Awake      07/12/21 1801 07/12/21 1800  remdesivir 100 mg in sodium chloride 0.9 % 270 mL IVPB (powder vial)  Every 24 Hours      07/11/21 0931    07/12/21 1115  sodium chloride 0.9 % with KCl 20 mEq/L infusion  Continuous      07/12/21 1025    07/12/21 0600  Document Pulse Oximetry - On Room Air / Home O2 Level  Daily     Comments:  Reapply Oxygen if O2 Sat Drops Below 88%    07/11/21 0931    07/12/21 0600  Comprehensive Metabolic Panel  Daily      07/11/21 0931    07/12/21 0105  loperamide (IMODIUM) capsule 2 mg  Every 6 Hours PRN      07/12/21 0111    07/11/21 2100  melatonin tablet 3 mg  Nightly      07/11/21 0931    07/11/21 1800  enoxaparin (LOVENOX) syringe 40 mg  Every 24 Hours      07/11/21 0931    07/11/21 1200  Pharmacy Consult - Remdesivir  Continuous PRN      07/11/21 0931    07/11/21 1200  cholecalciferol (VITAMIN D3) tablet 2,000 Units  Daily      07/11/21 0931    07/11/21 1200  ascorbic acid (VITAMIN C) tablet 500 mg  Daily      07/11/21 0931    07/11/21 1200  thiamine (VITAMIN B-1) tablet 100 mg  Daily      07/11/21 0931    07/11/21 1200  zinc sulfate (ZINCATE) capsule 220 mg  Daily      07/11/21 0931    07/11/21 1200  famotidine (PEPCID) injection 20 mg  2 Times Daily,   Status:  Discontinued      07/11/21 0931    07/11/21 1000  Strict Intake & Output  Every Hour      07/11/21 0931    07/11/21 0932  Daily Weights  Daily      07/11/21 0931    07/11/21 0930  ipratropium-albuterol (DUO-NEB) nebulizer solution 3 mL  Every 6 Hours PRN      07/11/21 0931    07/11/21 0928  ondansetron (ZOFRAN) injection 4 mg  Every 6 Hours PRN      07/11/21 0931    07/11/21 0926  acetaminophen (TYLENOL) tablet 650 mg  Every 4 Hours PRN      07/11/21 0931    07/11/21 0926  acetaminophen (TYLENOL) suppository 650 mg  Every 4 Hours PRN      07/11/21 0931    07/11/21 0926  albuterol sulfate HFA (PROVENTIL HFA;VENTOLIN HFA;PROAIR HFA) inhaler 2 puff  Every 6 Hours PRN      07/11/21 0931    07/11/21 0926  benzonatate (TESSALON) capsule 100 mg  3 Times Daily PRN      07/11/21 0931    07/11/21 0926  dextromethorphan polistirex ER (DELSYM) 30 MG/5ML oral suspension 60 mg  Every 12 Hours PRN      07/11/21 0931    07/11/21 0923  Intake & Output  Every Shift      07/11/21 0931    --  SCANNED - TELEMETRY        07/11/21 0000    --  SCANNED EKG      07/11/21 0000     --  SCANNED - TELEMETRY        07/11/21 0000    --  SCANNED - TELEMETRY        07/11/21 0000    --  SCANNED - TELEMETRY        07/11/21 0000                Ventilator/Non-Invasive Ventilation Settings (From admission, onward) Comment    None           Physician Progress Notes (last 24 hours) (Notes from 07/13/21 0702 through 07/14/21 0702)      Marc Merlos, DO at 07/13/21 1433              [facility] Medicine Services  INPATIENT PROGRESS NOTE    Patient Name: Elena Glaser  Date of Admission: 7/11/2021  Today's Date: 07/13/21  Length of Stay: 2  Primary Care Physician: Omar Muhammad MD    Subjective   Chief Complaint: Fatigue.   HPI   Today is not as good of a day as yesterday.  She has had nausea return.  She vomited earlier.  She is fatigued.  She would like to take a shower.  She does not feel like she is ready to go home today.  I agree.  I would like for her to have less symptoms and be tolerating diet prior to discharge.    She continues to not have any problems with hypoxia.  Her heart rate has not been an issue.  She has not had a fever in nearly 36 hours.    We will reevaluate on a daily basis for discharge.    Her inflammatory markers do remain elevated.    Review of Systems   All pertinent negatives and positives are as above. All other systems have been reviewed and are negative unless otherwise stated.     Objective    Temp:  [97.5 °F (36.4 °C)-99 °F (37.2 °C)] 97.5 °F (36.4 °C)  Heart Rate:  [] 109  Resp:  [18-20] 18  BP: (105-121)/(65-80) 105/80  Physical Exam  Constitutional:       Appearance: She is well-developed.      Comments: Up in bed.  No distress.  Watching television.  Discussed with her nurse, Cara    HENT:      Head: Normocephalic and atraumatic.   Eyes:      Conjunctiva/sclera: Conjunctivae normal.      Pupils: Pupils are equal, round, and reactive to light.   Neck:      Vascular: No JVD.   Cardiovascular:      Rate and Rhythm: Normal  rate and regular rhythm.      Heart sounds: Normal heart sounds. No murmur heard.   No friction rub. No gallop.    Pulmonary:      Effort: Pulmonary effort is normal. No respiratory distress.      Breath sounds: Normal breath sounds. No wheezing or rales.      Comments: On room air with no respiratory distress.  Chest:      Chest wall: No tenderness.   Abdominal:      General: Bowel sounds are normal. There is no distension.      Palpations: Abdomen is soft.      Tenderness: There is no abdominal tenderness. There is no guarding or rebound.   Musculoskeletal:         General: No tenderness or deformity. Normal range of motion.      Cervical back: Neck supple.   Skin:     General: Skin is warm and dry.      Findings: No rash.   Neurological:      Mental Status: She is alert and oriented to person, place, and time.      Cranial Nerves: No cranial nerve deficit.      Motor: No abnormal muscle tone.      Deep Tendon Reflexes: Reflexes normal.   Psychiatric:         Behavior: Behavior normal.         Thought Content: Thought content normal.         Judgment: Judgment normal.       Results Review:  I have reviewed the labs, radiology results, and diagnostic studies.    Laboratory Data:   Results from last 7 days   Lab Units 07/13/21 0423 07/12/21 0242 07/11/21 0633   WBC 10*3/mm3 8.54 7.27 9.80   HEMOGLOBIN g/dL 11.7* 12.8 15.3   HEMATOCRIT % 32.4* 34.8 41.9   PLATELETS 10*3/mm3 297 285 291     Results from last 7 days   Lab Units 07/13/21 0423 07/12/21 0242 07/11/21 0633   SODIUM mmol/L 135* 135* 129*   POTASSIUM mmol/L 3.3* 3.3* 3.2*   CHLORIDE mmol/L 104 100 94*   CO2 mmol/L 17.0* 22.0 17.0*   BUN mg/dL 11 10 19   CREATININE mg/dL 0.86 1.06* 1.27*   CALCIUM mg/dL 8.1* 8.8 9.0   BILIRUBIN mg/dL 0.3 0.3 0.5   ALK PHOS U/L 29* 32* 37*   ALT (SGPT) U/L 21 21 22   AST (SGOT) U/L 33* 30 32   GLUCOSE mg/dL 73 96 87     COVID LABS:  Results From Last 14 Days   Lab Units 07/13/21 0423 07/12/21 0242 07/11/21 0633  07/06/21 2009   PROBNP pg/mL  --   --  <5.0  --    CK TOTAL U/L  --   --  223*  --    CRP mg/dL 13.30*  --  10.26*  --    D DIMER QUANT mg/L (FEU)  --  0.51* 0.53* 0.53*   FERRITIN ng/mL 1,679.00*  --  1,450.00*  --    LDH U/L  --  369* 384*  --    PROCALCITONIN ng/mL  --  0.19 0.16  --    TROPONIN T ng/mL  --   --  <0.010  --      Culture Data:   Blood Culture   Date Value Ref Range Status   07/11/2021 No growth at less than 24 hours  Preliminary   07/11/2021 No growth at less than 24 hours  Preliminary   07/06/2021 No growth at 5 days  Final   07/06/2021 No growth at 5 days  Final     I have reviewed the patient's current medications.     Assessment/Plan     Active Hospital Problems    Diagnosis    • **Pneumonia due to COVID-19 virus    • Viral sepsis (CMS/HCC)    • Nausea, vomiting, and diarrhea    • Hyponatremia    • Hypokalemia    • Obesity (BMI 30-39.9)      Plan:  The patient was admitted on 7/11 by Dr. Olivier.  She has known COVID-19 infection.  She first tested positive for Covid on 7/3 at urgent care.  She was reevaluated in the ER on 7/6 for ongoing symptoms.  She returned to the emergency department on 7/11 with persistent nausea and vomiting.  She was found to have an ASUNCION and a serum sodium of 129.  She was referred for admission by the emergency department.    She has most of the classic symptoms for COVID-19.  However, 1 seems to be most pronounced is the GI issues.  She has been having quite a bit of diarrhea accompanied by nausea and vomiting.  She has had a difficult time with intake.  She was found to have an ASUNCION and to be hyponatremic.  Her ASUNCION and hyponatremia have resolved.  Replacing potassium in fluids.     She has multifocal peripheral interstitial infiltrates quite atypical in distribution and appearance for COVID-19 pneumonia.  However, she has not been hypoxic.  She has no pulmonary thromboembolic disease.  She has had a cough.  She has not been short of breath.  She has never required  oxygen.    Dr. Olivier started her on remdesivir with no overt hypoxemia.  This medication is not necessarily indicated at this point in time for this patient.  However, she was initiated and will continue for now.  Steroids were not initiated by Dr. Olivier and there is currently no indication to do so.  Continue adjunctive treatments.    She displayed signs of viral sepsis.  Her sinus tachycardia has improved significantly since presentation.  Her blood pressure is stable.  She remains on room air.    Reconciled and resumed home medications as appropriate.  Oral contraceptive and Maxide to be on hold at present.    Lovenox for DVT prophylaxis.    Discharge Planning: I expect the patient to be discharged to home in 1-2 days.    Electronically signed by Marc CUELLAR. DO Gaurang, 07/13/21, 14:33 CDT.      Electronically signed by Marc Merlos DO at 07/13/21 1438       Consult Notes (last 24 hours) (Notes from 07/13/21 0702 through 07/14/21 0702)    No notes of this type exist for this encounter.

## 2021-07-14 NOTE — PLAN OF CARE
Goal Outcome Evaluation:  Plan of Care Reviewed With: patient        Progress: no change  Outcome Summary: Pt states her BM are becoming more formed. Some episodes of emesis today; medicated with some relief. Fluids d/c. fever this shift. Vitals stable. RA. -116 per tele.

## 2021-07-14 NOTE — PROGRESS NOTES
[facility] Medicine Services  INPATIENT PROGRESS NOTE    Patient Name: Elena Glaser  Date of Admission: 7/11/2021  Today's Date: 07/14/21  Length of Stay: 3  Primary Care Physician: Omar Muhammad MD    Subjective   Chief Complaint: Fatigue.   HPI   Today will be day 4 of remdesivir.  Tomorrow will be the completion dose.  She did not meet specific criteria for this, but this was initiated by Dr. Olivier on 7/11 and the patient wishes to complete it.    She has been afebrile for greater than 48 hours.  She continues to not have any impairment of oxygen saturations and has never required oxygen.    She does continue to have escalating inflammatory markers.  She has also had a slight bump of her transaminases.    She also has a slight nongap metabolic acidosis that is likely secondary to recent sodium chloride administration.  Completely off of IV fluids today.  Potassium has normalized.    Still not eating well.  Had one episode of vomiting earlier this morning.  However, loose stool seems to be improving over the last few days.    Review of Systems   All pertinent negatives and positives are as above. All other systems have been reviewed and are negative unless otherwise stated.     Objective    Temp:  [97.5 °F (36.4 °C)-99.9 °F (37.7 °C)] 97.9 °F (36.6 °C)  Heart Rate:  [] 109  Resp:  [16-20] 18  BP: (105-132)/(74-84) 118/78  Physical Exam  Constitutional:       Appearance: She is well-developed.      Comments: Up in bed.  No distress.  Watching television.  Discussed with her nurse, Taylor.    HENT:      Head: Normocephalic and atraumatic.   Eyes:      Conjunctiva/sclera: Conjunctivae normal.      Pupils: Pupils are equal, round, and reactive to light.   Neck:      Vascular: No JVD.   Cardiovascular:      Rate and Rhythm: Normal rate and regular rhythm.      Heart sounds: Normal heart sounds. No murmur heard.   No friction rub. No gallop.    Pulmonary:      Effort:  Pulmonary effort is normal. No respiratory distress.      Breath sounds: Normal breath sounds. No wheezing or rales.      Comments: On room air with no respiratory distress.  Chest:      Chest wall: No tenderness.   Abdominal:      General: Bowel sounds are normal. There is no distension.      Palpations: Abdomen is soft.      Tenderness: There is no abdominal tenderness. There is no guarding or rebound.   Musculoskeletal:         General: No tenderness or deformity. Normal range of motion.      Cervical back: Neck supple.   Skin:     General: Skin is warm and dry.      Findings: No rash.   Neurological:      Mental Status: She is alert and oriented to person, place, and time.      Cranial Nerves: No cranial nerve deficit.      Motor: No abnormal muscle tone.      Deep Tendon Reflexes: Reflexes normal.   Psychiatric:         Behavior: Behavior normal.         Thought Content: Thought content normal.         Judgment: Judgment normal.       Results Review:  I have reviewed the labs, radiology results, and diagnostic studies.    Laboratory Data:   Results from last 7 days   Lab Units 07/14/21 0439 07/13/21 0423 07/12/21  0242   SODIUM mmol/L 136 135* 135*   POTASSIUM mmol/L 3.9 3.3* 3.3*   CHLORIDE mmol/L 108* 104 100   CO2 mmol/L 17.0* 17.0* 22.0   BUN mg/dL 11 11 10   CREATININE mg/dL 0.86 0.86 1.06*   CALCIUM mg/dL 7.9* 8.1* 8.8   BILIRUBIN mg/dL 0.4 0.3 0.3   ALK PHOS U/L 36* 29* 32*   ALT (SGPT) U/L 52* 21 21   AST (SGOT) U/L 95* 33* 30   GLUCOSE mg/dL 76 73 96     COVID LABS:  Results From Last 14 Days   Lab Units 07/14/21 0439 07/13/21 0423 07/12/21  0242 07/11/21  0633 07/06/21 2009   PROBNP pg/mL  --   --   --  <5.0  --    CK TOTAL U/L  --   --   --  223*  --    CRP mg/dL 15.91* 13.30*  --  10.26*  --    D DIMER QUANT mg/L (FEU)  --   --  0.51* 0.53* 0.53*   FERRITIN ng/mL 2,444.00* 1,679.00*  --  1,450.00*  --    LDH U/L  --   --  369* 384*  --    PROCALCITONIN ng/mL  --   --  0.19 0.16  --    TROPONIN  T ng/mL  --   --   --  <0.010  --      Culture Data:   Blood Culture   Date Value Ref Range Status   07/11/2021 No growth at less than 24 hours  Preliminary   07/11/2021 No growth at less than 24 hours  Preliminary   07/06/2021 No growth at 5 days  Final   07/06/2021 No growth at 5 days  Final     I have reviewed the patient's current medications.     Assessment/Plan     Active Hospital Problems    Diagnosis    • **Pneumonia due to COVID-19 virus    • Viral sepsis (CMS/HCC)    • Nausea, vomiting, and diarrhea    • Hyponatremia    • Hypokalemia    • Transaminitis    • Obesity (BMI 30-39.9)      Plan:  The patient was admitted on 7/11 by Dr. Olivier.  She has known COVID-19 infection.  She first tested positive for Covid on 7/3 at urgent care.  She was reevaluated in the ER on 7/6 for ongoing symptoms.  She returned to the emergency department on 7/11 with persistent nausea and vomiting.  She was found to have an ASUNCION and a serum sodium of 129.  She was referred for admission by the emergency department.    She has most of the classic symptoms for COVID-19.  However, 1 seems to be most pronounced is the GI issues.  She has been having quite a bit of diarrhea accompanied by nausea and vomiting.  She has had a difficult time with intake.  She was found to have an ASUNCION and to be hyponatremic.  Her ASUNCION and hyponatremia have resolved.  Replaced potassium in fluids.     She has multifocal peripheral interstitial infiltrates quite atypical in distribution and appearance for COVID-19 pneumonia.  However, she has not been hypoxic.  She has no pulmonary thromboembolic disease.  She has had a cough.  She has not been short of breath at rest, but gets a bit winded at times with exertion.  She has never required oxygen.    Dr. Olivier started her on remdesivir with no overt hypoxemia.  This medication is not necessarily indicated at this point in time for this patient.  However, it was initiated and we have continued for now given her  desire to do so.  Steroids were not initiated by Dr. Olivier and there is currently no indication to do so.  Continue adjunctive treatments.    Do have a bit of concern that her inflammatory markers continue to increase despite supportive care and remdesivir.  Also starting to have some elevation of her transaminases.  Continue to monitor these.  She still has not developed any significant respiratory symptoms despite having an abnormal chest x-ray.    She displayed signs of viral sepsis at presentation.  Her sinus tachycardia has improved significantly since presentation.  Her blood pressure is stable.  She remains on room air.    Reconciled and resumed home medications as appropriate.  Oral contraceptive and Maxide to be on hold at present.    Lovenox for DVT prophylaxis.    Discharge Planning: I expect the patient to be discharged to home in 1-2 days.    Electronically signed by Marc Merlos DO, 07/14/21, 10:19 CDT.

## 2021-07-15 ENCOUNTER — READMISSION MANAGEMENT (OUTPATIENT)
Dept: CALL CENTER | Facility: HOSPITAL | Age: 36
End: 2021-07-15

## 2021-07-15 VITALS
RESPIRATION RATE: 18 BRPM | WEIGHT: 164.25 LBS | SYSTOLIC BLOOD PRESSURE: 120 MMHG | HEIGHT: 60 IN | HEART RATE: 105 BPM | TEMPERATURE: 97.6 F | DIASTOLIC BLOOD PRESSURE: 83 MMHG | OXYGEN SATURATION: 97 % | BODY MASS INDEX: 32.25 KG/M2

## 2021-07-15 PROBLEM — D89.831 CYTOKINE RELEASE SYNDROME, GRADE 1: Status: ACTIVE | Noted: 2021-07-15

## 2021-07-15 LAB
ALBUMIN SERPL-MCNC: 3 G/DL (ref 3.5–5.2)
ALBUMIN/GLOB SERPL: 0.9 G/DL
ALP SERPL-CCNC: 53 U/L (ref 39–117)
ALT SERPL W P-5'-P-CCNC: 92 U/L (ref 1–33)
ANION GAP SERPL CALCULATED.3IONS-SCNC: 11 MMOL/L (ref 5–15)
AST SERPL-CCNC: 101 U/L (ref 1–32)
BILIRUB SERPL-MCNC: 0.7 MG/DL (ref 0–1.2)
BUN SERPL-MCNC: 8 MG/DL (ref 6–20)
BUN/CREAT SERPL: 9.9 (ref 7–25)
CALCIUM SPEC-SCNC: 8.2 MG/DL (ref 8.6–10.5)
CHLORIDE SERPL-SCNC: 107 MMOL/L (ref 98–107)
CO2 SERPL-SCNC: 18 MMOL/L (ref 22–29)
CREAT SERPL-MCNC: 0.81 MG/DL (ref 0.57–1)
CRP SERPL-MCNC: 17.3 MG/DL (ref 0–0.5)
DEPRECATED RDW RBC AUTO: 37.7 FL (ref 37–54)
ERYTHROCYTE [DISTWIDTH] IN BLOOD BY AUTOMATED COUNT: 13.2 % (ref 12.3–15.4)
FERRITIN SERPL-MCNC: 2332 NG/ML (ref 13–150)
GFR SERPL CREATININE-BSD FRML MDRD: 98 ML/MIN/1.73
GLOBULIN UR ELPH-MCNC: 3.2 GM/DL
GLUCOSE SERPL-MCNC: 85 MG/DL (ref 65–99)
HCT VFR BLD AUTO: 32.2 % (ref 34–46.6)
HGB BLD-MCNC: 11.2 G/DL (ref 12–15.9)
MCH RBC QN AUTO: 27.1 PG (ref 26.6–33)
MCHC RBC AUTO-ENTMCNC: 34.8 G/DL (ref 31.5–35.7)
MCV RBC AUTO: 78 FL (ref 79–97)
PLATELET # BLD AUTO: 390 10*3/MM3 (ref 140–450)
PMV BLD AUTO: 9.5 FL (ref 6–12)
POTASSIUM SERPL-SCNC: 3.6 MMOL/L (ref 3.5–5.2)
PROT SERPL-MCNC: 6.2 G/DL (ref 6–8.5)
RBC # BLD AUTO: 4.13 10*6/MM3 (ref 3.77–5.28)
SODIUM SERPL-SCNC: 136 MMOL/L (ref 136–145)
WBC # BLD AUTO: 11.66 10*3/MM3 (ref 3.4–10.8)

## 2021-07-15 PROCEDURE — 25010000002 PROCHLORPERAZINE 10 MG/2ML SOLUTION: Performed by: INTERNAL MEDICINE

## 2021-07-15 PROCEDURE — 85027 COMPLETE CBC AUTOMATED: CPT | Performed by: INTERNAL MEDICINE

## 2021-07-15 PROCEDURE — 80053 COMPREHEN METABOLIC PANEL: CPT | Performed by: FAMILY MEDICINE

## 2021-07-15 PROCEDURE — 94618 PULMONARY STRESS TESTING: CPT

## 2021-07-15 PROCEDURE — 86140 C-REACTIVE PROTEIN: CPT | Performed by: INTERNAL MEDICINE

## 2021-07-15 PROCEDURE — 82728 ASSAY OF FERRITIN: CPT | Performed by: INTERNAL MEDICINE

## 2021-07-15 RX ORDER — ONDANSETRON 4 MG/1
4 TABLET, ORALLY DISINTEGRATING ORAL EVERY 8 HOURS PRN
Qty: 15 TABLET | Refills: 1 | Status: SHIPPED | OUTPATIENT
Start: 2021-07-15

## 2021-07-15 RX ORDER — BENZONATATE 200 MG/1
200 CAPSULE ORAL 3 TIMES DAILY PRN
Qty: 15 CAPSULE | Refills: 1 | Status: SHIPPED | OUTPATIENT
Start: 2021-07-15

## 2021-07-15 RX ADMIN — REMDESIVIR 100 MG: 100 INJECTION, POWDER, LYOPHILIZED, FOR SOLUTION INTRAVENOUS at 16:37

## 2021-07-15 RX ADMIN — OXYCODONE HYDROCHLORIDE AND ACETAMINOPHEN 500 MG: 500 TABLET ORAL at 08:41

## 2021-07-15 RX ADMIN — GUAIFENESIN 1200 MG: 600 TABLET, EXTENDED RELEASE ORAL at 08:41

## 2021-07-15 RX ADMIN — PROCHLORPERAZINE EDISYLATE 5 MG: 5 INJECTION INTRAMUSCULAR; INTRAVENOUS at 08:42

## 2021-07-15 RX ADMIN — ZINC SULFATE 220 MG (50 MG) CAPSULE 220 MG: CAPSULE at 08:41

## 2021-07-15 RX ADMIN — FAMOTIDINE 20 MG: 20 TABLET, FILM COATED ORAL at 08:41

## 2021-07-15 RX ADMIN — Medication 2000 UNITS: at 08:41

## 2021-07-15 RX ADMIN — ACETAMINOPHEN 650 MG: 325 TABLET, FILM COATED ORAL at 08:41

## 2021-07-15 RX ADMIN — FAMOTIDINE 20 MG: 20 TABLET, FILM COATED ORAL at 16:37

## 2021-07-15 RX ADMIN — THIAMINE HCL TAB 100 MG 100 MG: 100 TAB at 08:41

## 2021-07-15 RX ADMIN — PROCHLORPERAZINE EDISYLATE 5 MG: 5 INJECTION INTRAMUSCULAR; INTRAVENOUS at 00:54

## 2021-07-15 RX ADMIN — BENZONATATE 100 MG: 100 CAPSULE ORAL at 08:41

## 2021-07-15 NOTE — OUTREACH NOTE
Prep Survey      Responses   [facility] facility patient discharged from?  [place]   Is LACE score < 7 ?  No   Emergency Room discharge w/ pulse ox?  No   Eligibility  Readm Mgmt   Discharge diagnosis  Pneumonia due to COVID-19 virus   Does the patient have one of the following disease processes/diagnoses(primary or secondary)?  COVID-19   Does the patient have Home health ordered?  No   Is there a DME ordered?  No   Prep survey completed?  Yes          Enma Fuentes RN

## 2021-07-15 NOTE — DISCHARGE SUMMARY
[facility] Medicine Services  DISCHARGE SUMMARY       Date of Admission: 7/11/2021  Date of Discharge:  7/15/2021  Primary Care Physician: Omar Muhammad MD    Presenting Problem/History of Present Illness:  Nausea, vomiting, diarrhea.     Final Discharge Diagnoses:  Active Hospital Problems    Diagnosis    • **Pneumonia due to COVID-19 virus    • Viral sepsis (CMS/HCC)    • Nausea, vomiting, and diarrhea    • Hyponatremia    • Hypokalemia    • Transaminitis    • Obesity (BMI 30-39.9)      Consults: None.    Procedures Performed: None.     Pertinent Test Results:   Imaging Results (All)     Procedure Component Value Units Date/Time    XR Chest 1 View [[id number]] Collected: 07/11/21 0801     Updated: 07/11/21 0804    Narrative:      EXAMINATION: XR CHEST 1 VW-     7/11/2021 6:13 AM CDT     HISTORY: Shortness of breath.     1 view chest x-ray compared with July 6, 2021.     The heart and mediastinum are within normal limits.     Slightly lower lung volumes with mild interstitial infiltrate now  present in both lower lobes.     No pneumothorax.     Summary:  1. Mild interstitial infiltrate now present in both lower lobes.     This report was finalized on 07/11/2021 08:01 by Dr. Ben Connell MD.        LAB RESULTS:      Lab 07/15/21  0423 07/14/21  0439 07/13/21  0423 07/12/21  0242 07/11/21  0633   WBC 11.66*  --  8.54 7.27 9.80   HEMOGLOBIN 11.2*  --  11.7* 12.8 15.3   HEMATOCRIT 32.2*  --  32.4* 34.8 41.9   PLATELETS 390  --  297 285 291   NEUTROS ABS  --   --  6.38 4.98 8.11*   IMMATURE GRANS (ABS)  --   --   --  0.11* 0.14*   LYMPHS ABS  --   --   --  1.65 1.00   MONOS ABS  --   --   --  0.51 0.51   EOS ABS  --   --   --  0.00 0.00   MCV 78.0*  --  77.1* 77.0* 76.2*   CRP 17.30* 15.91* 13.30*  --  10.26*   PROCALCITONIN  --   --   --  0.19 0.16   LDH  --   --   --  369* 384*         Lab 07/15/21  0423 07/14/21  0439 07/13/21  0423 07/12/21  0242 07/11/21  0633   SODIUM 136  136 135* 135* 129*   POTASSIUM 3.6 3.9 3.3* 3.3* 3.2*   CHLORIDE 107 108* 104 100 94*   CO2 18.0* 17.0* 17.0* 22.0 17.0*   ANION GAP 11.0 11.0 14.0 13.0 18.0*   BUN 8 11 11 10 19   CREATININE 0.81 0.86 0.86 1.06* 1.27*   GLUCOSE 85 76 73 96 87   CALCIUM 8.2* 7.9* 8.1* 8.8 9.0   MAGNESIUM  --   --   --  1.8 2.2   PHOSPHORUS  --   --   --   --  2.0*         Lab 07/15/21  0423 07/14/21  0439 07/13/21  0423 07/12/21  0242 07/11/21  0633   TOTAL PROTEIN 6.2 5.8* 6.1 6.8 8.1   ALBUMIN 3.00* 3.00* 3.10* 3.60 4.20   GLOBULIN 3.2 2.8 3.0 3.2 3.9   ALT (SGPT) 92* 52* 21 21 22   AST (SGOT) 101* 95* 33* 30 32   BILIRUBIN 0.7 0.4 0.3 0.3 0.5   ALK PHOS 53 36* 29* 32* 37*   LIPASE  --  75*  --   --  62*         Lab 07/11/21  0633   PROBNP <5.0   TROPONIN T <0.010             Lab 07/15/21  0423 07/11/21  0633   FERRITIN 2,332.00* 1,450.00*   ABO TYPING  --  A   RH TYPING  --  Positive   ANTIBODY SCREEN  --  Negative         Lab 07/11/21  0626   PH, ARTERIAL 7.539*   PCO2, ARTERIAL 20.4*   PO2 ART 67.9*   O2 SATURATION ART 95.7   HCO3 ART 17.4*   BASE EXCESS ART -2.7*     Brief Urine Lab Results  (Last result in the past 365 days)      Color   Clarity   Blood   Leuk Est   Nitrite   Protein   CREAT   Urine HCG        07/11/21 0810 Yellow Clear Small (1+) Negative Negative Negative             Microbiology Results (last 10 days)     Procedure Component Value - Date/Time    Respiratory Panel, PCR (WITHOUT COVID) - Swab, Nasopharynx [[id number]]  (Normal) Collected: 07/11/21 8584    Lab Status: Final result Specimen: Swab from Nasopharynx Updated: 07/11/21 2007     ADENOVIRUS, PCR Not Detected     Coronavirus 229E Not Detected     Coronavirus HKU1 Not Detected     Coronavirus NL63 Not Detected     Coronavirus OC43 Not Detected     Human Metapneumovirus Not Detected     Human Rhinovirus/Enterovirus Not Detected     Influenza B PCR Not Detected     Parainfluenza Virus 1 Not Detected     Parainfluenza Virus 2 Not Detected     Parainfluenza  Virus 3 Not Detected     Parainfluenza Virus 4 Not Detected     Bordetella pertussis pcr Not Detected     Chlamydophila pneumoniae PCR Not Detected     Mycoplasma pneumo by PCR Not Detected     Influenza A PCR Not Detected     RSV, PCR Not Detected     Bordetella parapertussis PCR Not Detected    Narrative:      The coronavirus on the RVP is NOT COVID-19 and is NOT indicative of infection with COVID-19.    In the setting of a positive respiratory panel with a viral infection PLUS a negative procalcitonin without other underlying concern for bacterial infection, consider observing off antibiotics or discontinuation of antibiotics and continue supportive care. If the respiratory panel is positive for atypical bacterial infection (Bordetella pertussis, Chlamydophila pneumoniae, or Mycoplasma pneumoniae), consider antibiotic de-escalation to target atypical bacterial infection.    Legionella Antigen, Urine - Urine, Urine, Clean Catch [[id number]]  (Normal) Collected: 07/11/21 1851    Lab Status: Final result Specimen: Urine, Clean Catch Updated: 07/11/21 1944     LEGIONELLA ANTIGEN, URINE Negative    S. Pneumo Ag Urine or CSF - Urine, Urine, Clean Catch [[id number]]  (Normal) Collected: 07/11/21 1851    Lab Status: Final result Specimen: Urine, Clean Catch Updated: 07/11/21 1945     Strep Pneumo Ag Negative    Blood Culture With RHEA - Blood, Arm, Left [[id number]] Collected: 07/11/21 1842    Lab Status: Preliminary result Specimen: Blood from Arm, Left Updated: 07/14/21 1930     Blood Culture No growth at 3 days    Blood Culture With RHEA - Blood, Arm, Right [[id number]] Collected: 07/11/21 1842    Lab Status: Preliminary result Specimen: Blood from Arm, Right Updated: 07/14/21 1930     Blood Culture No growth at 3 days    Blood Culture - Blood, Arm, Left [[id number]] Collected: 07/06/21 2009    Lab Status: Final result Specimen: Blood from Arm, Left Updated: 07/11/21 2130     Blood Culture No growth at 5 days    Blood  Culture - Blood, Arm, Right [[id number]] Collected: 07/06/21 2009    Lab Status: Final result Specimen: Blood from Arm, Right Updated: 07/11/21 2130     Blood Culture No growth at 5 days        Results From Last 14 Days   Lab Units 07/15/21  0423 07/14/21  0439 07/13/21  0423 07/12/21  0242 07/11/21  0633 07/06/21 2009   PROBNP pg/mL  --   --   --   --  <5.0  --    CK TOTAL U/L  --   --   --   --  223*  --    CRP mg/dL 17.30* 15.91* 13.30*  --  10.26*  --    D DIMER QUANT mg/L (FEU)  --   --   --  0.51* 0.53* 0.53*   FERRITIN ng/mL 2,332.00* 2,444.00* 1,679.00*  --  1,450.00*  --    LDH U/L  --   --   --  369* 384*  --    PROCALCITONIN ng/mL  --   --   --  0.19 0.16  --    TROPONIN T ng/mL  --   --   --   --  <0.010  --      Hospital Course:  The patient was admitted on 7/11 by Dr. Olivier.  She has known COVID-19 infection.  She first tested positive for Covid on 7/3 at urgent care.  She was reevaluated in the ER on 7/6 for ongoing symptoms.  She returned to the emergency department on 7/11 with persistent nausea and vomiting.  She was found to have an ASUNCION and a serum sodium of 129.  She was referred for admission by the emergency department.     She has most of the classic symptoms for COVID-19.  However, 1 seems to be most pronounced is the GI issues.  She has been having quite a bit of diarrhea accompanied by nausea and vomiting.  She has had a difficult time with intake.  She was found to have an ASUNCION and to be hyponatremic.  Her ASUNCION and hyponatremia have resolved.  Replaced potassium in fluids.      She had multifocal peripheral interstitial infiltrates quite atypical in distribution and appearance for COVID-19 pneumonia on CXR.  She has no pulmonary thromboembolic disease.  She has had a cough.  She has not been short of breath at rest, but gets a bit winded at times with exertion.  She has never been hypoxic.  We even did a walking oximetry this morning in the room that was fairly vigorous.  She never had a  saturation below 95%. She has never required oxygen.     Dr. Olivier started her on remdesivir with no overt hypoxemia.  This medication is not necessarily indicated at this point in time for this patient.  However, it was initiated and we have continued for now given her desire to do so. Today is day 5. Steroids were not initiated by Dr. Olivier and there is currently no indication to do so.  Continue adjunctive treatments.     Her inflammatory marker trend has been of concern to me.  Her CRP was 10.26 at presentation and was 17.3 today.  Her ferritin finally started declining today after plateauing yesterday.  She did develop some elevated transaminases that could be secondary to remdesivir or her viral illness in general.  She did not receive atorvastatin.  Would recommend that Dr. Muhammad repeat a CMP and inflammatory markers when he sees her after being cleared by the health department.     She displayed signs of viral sepsis at presentation.  Her sinus tachycardia has improved since presentation.  Her blood pressure is stable.  She remains on room air.     Reconciled and resumed home medications as appropriate.  Oral contraceptive and Maxide to be on hold at present.     Lovenox was used for DVT prophylaxis.    She very much wants to go home today.  She has had no vomiting in 24 hours.  She states she is tolerating diet better.  She has not had a loose stool today.  I think she does look quite a bit better.  She denies being short of breath even with exertion.  She has not been coughing very much.  Her T-max over the last 48 hours is 100.0.  We talked about things that would bring her back to the emergency department that include new, persistent fever or the development of hypoxemia.  We obtained a pulse oximeter from  and I instructed her on using it several times a day, especially if she feels short of breath.  I told her that persistent saturations between 90-94% would be an indicator for her to  "seek medical attention once again.    Physical Exam on Discharge:  /83 (BP Location: Left arm, Patient Position: Sitting)   Pulse 105   Temp 97.6 °F (36.4 °C) (Oral)   Resp 18   Ht 152.4 cm (60\")   Wt 74.5 kg (164 lb 4 oz)   LMP 06/29/2021   SpO2 97%   BMI 32.08 kg/m²   Physical Exam  Constitutional:       Appearance: She is well-developed.      Comments: Up and ambulatory to bathroom when I first came in.  No distress. Looks much better than she has. Discussed with her nurse, Мария.    HENT:      Head: Normocephalic and atraumatic.   Eyes:      Conjunctiva/sclera: Conjunctivae normal.      Pupils: Pupils are equal, round, and reactive to light.   Neck:      Vascular: No JVD.   Cardiovascular:      Rate and Rhythm: Slight sinus tachycardia with activity.      Heart sounds: Normal heart sounds.   Pulmonary:      Effort: Pulmonary effort is normal. No respiratory distress.       Comments: On room air with no respiratory distress.   Musculoskeletal:         General: No tenderness or deformity. Normal range of motion.      Cervical back: Neck supple.   Skin:     General: Skin is warm and dry.      Findings: No rash.   Neurological:      Mental Status: She is alert and oriented to person, place, and time.      Cranial Nerves: No cranial nerve deficit.      Motor: No abnormal muscle tone.      Deep Tendon Reflexes: Reflexes normal.   Psychiatric:         Behavior: Behavior normal.         Thought Content: Thought content normal.         Judgment: Judgment normal.     Condition on Discharge: Stable for home care and I believe her to be reliable for self monitoring of recurrent fevers or developing hypoxia.     Discharge Disposition:  Home or Self Care    Discharge Medications:     Discharge Medications      New Medications      Instructions Start Date   ondansetron ODT 4 MG disintegrating tablet  Commonly known as: Zofran ODT   4 mg, Translingual, Every 8 Hours PRN         Continue These Medications      " Instructions Start Date   benzonatate 200 MG capsule  Commonly known as: TESSALON   200 mg, Oral, 3 Times Daily PRN      Lessina 0.1-20 MG-MCG per tablet  Generic drug: levonorgestrel-ethinyl estradiol   1 tablet, Oral, Daily      promethazine 25 MG tablet  Commonly known as: PHENERGAN   25 mg, Oral, Every 6 Hours PRN         Stop These Medications    triamterene-hydrochlorothiazide 37.5-25 MG per tablet  Commonly known as: MAXZIDE-25          Discharge Diet:   Diet Instructions     Advance Diet As Tolerated          Activity at Discharge:   Activity Instructions     Activity as Tolerated          Follow-up Appointments:   Dr. Muhammad when released by the health department. Recommend repeat CMP and inflammatory markers at that time.     Test Results Pending at Discharge: None.     Electronically signed by Marc Merlos DO, 07/15/21, 15:59 CDT.    Time: 35 minutes.

## 2021-07-15 NOTE — PLAN OF CARE
"Goal Outcome Evaluation:  Plan of Care Reviewed With: patient        Progress: improving  Outcome Summary: Pt states that she cannot get up on her own becasue she is \"too weak.\"  Had nursing perform ROM on lower extremities because she was \"too tired\" to pick her legs up.  Encouraged patient to get up and walk around her room.  PRN Compazine given for c/o of nausea with good results.  No instances of diarrhea this shift.  PRN Tylenol given for c/o of generalized pain.  VSS.  Continues on room air.  Denies SOA; does have non-productive, congested cough.  NSR/ST  on tele.  Safety maintained.  Will continue to [facility].  Possible discharge home today.  Isolation precautions maintained.    "

## 2021-07-15 NOTE — PROGRESS NOTES
Exercise Oximetry    Patient Name:Elena Glaser   MRN: [id number]   Date: 07/15/21             ROOM AIR BASELINE   SpO2% 98   Heart Rate 111   Blood Pressure      EXERCISE ON ROOM AIR SpO2% EXERCISE ON O2 @ LPM SpO2%   1 MINUTE 99 1 MINUTE    2 MINUTES 99 2 MINUTES    3 MINUTES 97 3 MINUTES    4 MINUTES 95 4 MINUTES    5 MINUTES 98 5 MINUTES    6 MINUTES  6 MINUTES               Distance Walked  200 feet Distance Walked   Dyspnea (Saima Scale)  0 Dyspnea (Saima Scale)   Fatigue (Saima Scale)  0 Fatigue (Saima Scale)   SpO2% Post Exercise 98 SpO2% Post Exercise   HR Post Exercise  115 HR Post Exercise   Time to Recovery 5 minutes Time to Recovery     Comments:   Recommend no oxygen needed at this time.

## 2021-07-16 ENCOUNTER — NURSE TRIAGE (OUTPATIENT)
Dept: CALL CENTER | Facility: HOSPITAL | Age: 36
End: 2021-07-16

## 2021-07-16 ENCOUNTER — READMISSION MANAGEMENT (OUTPATIENT)
Dept: CALL CENTER | Facility: HOSPITAL | Age: 36
End: 2021-07-16

## 2021-07-16 LAB
BACTERIA SPEC AEROBE CULT: NORMAL
BACTERIA SPEC AEROBE CULT: NORMAL

## 2021-07-16 NOTE — OUTREACH NOTE
COVID-19 Week 1 Survey      Responses   [facility] patient discharged from?  [place]   Does the patient have one of the following disease processes/diagnoses(primary or secondary)?  COVID-19   COVID-19 underlying condition?  None   Call Number  Call 1   Week 1 Call successful?  Yes   Call start time  1047   Call end time  1055   Discharge diagnosis  Pneumonia due to COVID-19 virus   Is patient permission given to speak with other caregiver?  Yes   List who call center can speak with  Mother   Meds reviewed with patient/caregiver?  Yes   Is the patient having any side effects they believe may be caused by any medication additions or changes?  No   Does the patient have all medications ordered at discharge?  Yes   Is the patient taking all medications as directed (includes completed medication regime)?  Yes   Does the patient have a primary care provider?   Yes   Comments regarding PCP  was told by PCP to wait until [facility] called her to see when she will be out of quarantine, before scheduling appt with PCP.   Does the patient have an appointment with their PCP or specialist within 7 days of discharge?  No   What is preventing the patient from scheduling follow up appointments within 7 days of discharge?  Waiting on return call   Has the patient kept scheduled appointments due by today?  N/A   Psychosocial issues?  No   Comments  Pt sleeping more. Still has some nausea after drinking apple juice. Staying hydrated, appetite is good. Denies diarrhea. Monitoring temp daily, had temp 100. last night but Tylenol brought it down.    Did the patient receive a copy of their discharge instructions?  Yes   Did the patient receive a copy of COVID-19 specific instructions?  Yes   Nursing interventions  Reviewed instructions with patient   What is the patient's perception of their health status since discharge?  Improving   Does the patient have any of the following symptoms?  Fever/chills [N/V]   Pulse Ox monitoring   Intermittent   Pulse Ox device source  Patient   O2 Sat comments  95%RA    O2 Sat: education provided  Sat levels   Is the patient/caregiver able to teach back steps to recovery at home?  Set small, achievable goals for return to baseline health, Rest and rebuild strength, gradually increase activity   If the patient is a current smoker, are they able to teach back resources for cessation?  Not a smoker   Is the patient/caregiver able to teach back the hierarchy of who to call/visit for symptoms/problems? PCP, Specialist, Home health nurse, Urgent Care, ED, 911  Yes   COVID-19 call completed?  Yes   Wrap up additional comments  staying with her mother currently.           Estephanie Villalobos RN

## 2021-07-16 NOTE — TELEPHONE ENCOUNTER
"Caller was discharged yesterday after having covid.  Her birthcontrol pills and BP meds were on hold while admitted.  Her AVS says to hold the BP med but she is wanting to make sure about the BCP.  AVS and [facility] records reviewed.  Recommended following up with her PCP on Monday and continue to BCP for now.  Reason for Disposition  • [1] Caller requesting NON-URGENT health information AND [2] PCP's office is the best resource    Additional Information  • Negative: [1] Caller is not with the adult (patient) AND [2] reporting urgent symptoms  • Negative: Lab result questions  • Negative: Medication questions  • Negative: Caller can't be reached by phone  • Negative: Caller has already spoken to PCP or another triager  • Negative: RN needs further essential information from caller in order to complete triage  • Negative: Requesting regular office appointment    Answer Assessment - Initial Assessment Questions  1. REASON FOR CALL or QUESTION: \"What is your reason for calling today?\" or \"How can I best help you?\" or \"What question do you have that I can help answer?\"      I have a question about my discharge instructions.    Protocols used: INFORMATION ONLY CALL - NO TRIAGE-ADULT-    "

## 2021-07-16 NOTE — PAYOR COMM NOTE
"7/16/21. [facility] [phone]  [phone]      PATIENT ER ADMISSION TO INPATIENT ON 7/11/21.  2 INPATIENT DAYS APPROVED 7/11/21-7/12/21.    CLINICAL UPDATE FAXED ON 7/13/21 STATES COMPLETE     auth [id number]    FAXING UPDATE CLINICAL AND D/C SUMMARY.    WE STILL NEED INPATIENT DAYS APPROVED.        Elena Glaser (35 y.o. Female)     Date of Birth Social Security Number Address Home Phone MRN    1985  [address]  [address]  [facility] [address] [phone] [id number]    [employer] Mar[employer] Status          Other Single       Admission Date Admission Type Admitting Provider Attending Provider Department, Room/Bed    7/11/21 Emergency Marc Merlos DO  [facility] 4B, 443/1    Discharge Date Discharge Disposition Discharge Destination        7/15/2021 Home or Self Care Home             Attending Provider: (none)   Allergies: No Known Allergies    Isolation: None   Infection: COVID (confirmed) (07/03/21)   Code Status: Prior    Ht: 152.4 cm (60\")   Wt: 74.5 kg (164 lb 4 oz)    Admission Cmt: None   Principal Problem: Pneumonia due to COVID-19 virus [U07.1,J12.82]                 Active Insurance as of 7/11/2021     Primary Coverage     Payor Plan Insurance Group Employer/Plan Group    [employer] BLUE CROSS [employer] BLUE SHIELD PPO [id number]     Payor Plan Address Payor Plan Phone Number Payor Plan Fax Number Effective Dates     [address] [phone]  1/1/2013 - None Entered    [place], [place] [address]       Subscriber Name Subscriber Birth Date Member ID       ELENA GLASER 1985 [id number]                 Emergency Contacts      (Rel.) Home Phone Work Phone Mobile Phone    JUWAN RAMEY (Mother) [phone] -- --        [facility] Encounter Date/Time: 7/11/2021 0529   Hospital Account: [id number]    MRN: [id number]   Patient:  Elena Glaser   Contact Serial #: [id number]   SSN:          ENCOUNTER             Patient Class: " Inpatient   Unit: [address]   Hospital Service: Medicine     Bed: 443/1   Admitting Provider: Marc Merlos DO   Referring Physician:     Attending Provider:     Adm Diagnosis: Systemic inflammatory re*               PATIENT          Name: Elena Patel : 1985 (35 yrs)   Address: [address] Sex: Female   City: [place] [address]   County: [facility]   Marital Status: SINGLE Ethnicity: NOT                                                                              Race: BLACK   Primary Care Provider: Omar Muhammad MD Patients Phone: Home Phone: [phone]  Work Phone: [phone]  Mobile Phone: [phone]   EMERGENCY CONTACT   Contact Name Legal Guardian? Relationship to Patient Home Phone Work Phone   1. JUWAN RAMEY  2. *No Contact Specified*      Mother    [phone]              GUARANTOR            Guarantor: Elena Patel     : 1985   Address: [facility] Sex: [place]     [place] [address]     Relation to Patient: Self       Home Phone: [phone]   Guarantor ID: [id number]       Work Phone: [phone]   GUARANTOR EMPLOYER   Employer: [employer]         Status: FULL TIME   COVERAGE          PRIMARY INSURANCE   Payor: [employer] BLUE CROSS Plan: [employer] BLUE SHIELD PPO   Group Number: [id number] Insurance Type: INDEMNITY   Subscriber Name: ELENA PATEL Subscriber : 1985   Subscriber ID: [id number] Coverage Address: [place] [address]   Pat. Rel. to Subscriber: Self Coverage Phone: [phone]   SECONDARY INSURANCE   Payor: N/A Plan: N/A   Group Number:   Insurance Type:     Subscriber Name:   Subscriber :     Subscriber ID:   Coverage Address:     Pat. Rel. to Subscriber:   Coverage Phone:        Contact Serial # ([id number])  `       2021    Chart ID ([id number])              5:29 AM Bh Pad 4b [id number]      Marc Merlos DO   Physician   Medicine   Progress Notes      Signed   Date  of Service:  07/13/21 1433   Creation Time:  07/13/21 1433            Signed             Show:Clear all  [x]Manual[x]Template[x]Copied    Added by:  [x]Marc Merlos DO    []Jessy for details       [facility] Medicine Services  INPATIENT PROGRESS NOTE     Patient Name: Elena Glaser  Date of Admission: 7/11/2021  Today's Date: 07/13/21  Length of Stay: 2  Primary Care Physician: Omar Muhammad MD     Subjective   Chief Complaint: Fatigue.   HPI   Today is not as good of a day as yesterday.  She has had nausea return.  She vomited earlier.  She is fatigued.  She would like to take a shower.  She does not feel like she is ready to go home today.  I agree.  I would like for her to have less symptoms and be tolerating diet prior to discharge.     She continues to not have any problems with hypoxia.  Her heart rate has not been an issue.  She has not had a fever in nearly 36 hours.     We will reevaluate on a daily basis for discharge.     Her inflammatory markers do remain elevated.     Review of Systems   All pertinent negatives and positives are as above. All other systems have been reviewed and are negative unless otherwise stated.      Objective    Temp:  [97.5 °F (36.4 °C)-99 °F (37.2 °C)] 97.5 °F (36.4 °C)  Heart Rate:  [] 109  Resp:  [18-20] 18  BP: (105-121)/(65-80) 105/80  Physical Exam  Constitutional:       Appearance: She is well-developed.      Comments: Up in bed.  No distress.  Watching television.  Discussed with her nurse, Taylor.    HENT:      Head: Normocephalic and atraumatic.   Eyes:      Conjunctiva/sclera: Conjunctivae normal.      Pupils: Pupils are equal, round, and reactive to light.   Neck:      Vascular: No JVD.   Cardiovascular:      Rate and Rhythm: Normal rate and regular rhythm.      Heart sounds: Normal heart sounds. No murmur heard.   No friction rub. No gallop.    Pulmonary:      Effort: Pulmonary effort is normal. No respiratory distress.       Breath sounds: Normal breath sounds. No wheezing or rales.      Comments: On room air with no respiratory distress.  Chest:      Chest wall: No tenderness.   Abdominal:      General: Bowel sounds are normal. There is no distension.      Palpations: Abdomen is soft.      Tenderness: There is no abdominal tenderness. There is no guarding or rebound.   Musculoskeletal:         General: No tenderness or deformity. Normal range of motion.      Cervical back: Neck supple.   Skin:     General: Skin is warm and dry.      Findings: No rash.   Neurological:      Mental Status: She is alert and oriented to person, place, and time.      Cranial Nerves: No cranial nerve deficit.      Motor: No abnormal muscle tone.      Deep Tendon Reflexes: Reflexes normal.   Psychiatric:         Behavior: Behavior normal.         Thought Content: Thought content normal.         Judgment: Judgment normal.         Results Review:  I have reviewed the labs, radiology results, and diagnostic studies.     Laboratory Data:          Results from last 7 days   Lab Units 07/13/21 0423 07/12/21 0242 07/11/21 0633   WBC 10*3/mm3 8.54 7.27 9.80   HEMOGLOBIN g/dL 11.7* 12.8 15.3   HEMATOCRIT % 32.4* 34.8 41.9   PLATELETS 10*3/mm3 297 285 291             Results from last 7 days   Lab Units 07/13/21 0423 07/12/21 0242 07/11/21 0633   SODIUM mmol/L 135* 135* 129*   POTASSIUM mmol/L 3.3* 3.3* 3.2*   CHLORIDE mmol/L 104 100 94*   CO2 mmol/L 17.0* 22.0 17.0*   BUN mg/dL 11 10 19   CREATININE mg/dL 0.86 1.06* 1.27*   CALCIUM mg/dL 8.1* 8.8 9.0   BILIRUBIN mg/dL 0.3 0.3 0.5   ALK PHOS U/L 29* 32* 37*   ALT (SGPT) U/L 21 21 22   AST (SGOT) U/L 33* 30 32   GLUCOSE mg/dL 73 96 87      COVID LABS:          Results From Last 14 Days   Lab Units 07/13/21 0423 07/12/21 0242 07/11/21 0633 07/06/21 2009   PROBNP pg/mL  --   --  <5.0  --    CK TOTAL U/L  --   --  223*  --    CRP mg/dL 13.30*  --  10.26*  --    D DIMER QUANT mg/L (FEU)  --  0.51* 0.53* 0.53*      FERRITIN ng/mL 1,679.00*  --  1,450.00*  --    LDH U/L  --  369* 384*  --    PROCALCITONIN ng/mL  --  0.19 0.16  --    TROPONIN T ng/mL  --   --  <0.010  --       Culture Data:         Blood Culture   Date Value Ref Range Status   07/11/2021 No growth at less than 24 hours   Preliminary   07/11/2021 No growth at less than 24 hours   Preliminary   07/06/2021 No growth at 5 days   Final   07/06/2021 No growth at 5 days   Final      I have reviewed the patient's current medications.      Assessment/Plan           Active Hospital Problems     Diagnosis     • **Pneumonia due to COVID-19 virus     • Viral sepsis (CMS/HCC)     • Nausea, vomiting, and diarrhea     • Hyponatremia     • Hypokalemia     • Obesity (BMI 30-39.9)        Plan:  The patient was admitted on 7/11 by Dr. Olivier.  She has known COVID-19 infection.  She first tested positive for Covid on 7/3 at urgent care.  She was reevaluated in the ER on 7/6 for ongoing symptoms.  She returned to the emergency department on 7/11 with persistent nausea and vomiting.  She was found to have an ASUNCION and a serum sodium of 129.  She was referred for admission by the emergency department.     She has most of the classic symptoms for COVID-19.  However, 1 seems to be most pronounced is the GI issues.  She has been having quite a bit of diarrhea accompanied by nausea and vomiting.  She has had a difficult time with intake.  She was found to have an ASUNCION and to be hyponatremic.  Her ASUNCION and hyponatremia have resolved.  Replacing potassium in fluids.      She has multifocal peripheral interstitial infiltrates quite atypical in distribution and appearance for COVID-19 pneumonia.  However, she has not been hypoxic.  She has no pulmonary thromboembolic disease.  She has had a cough.  She has not been short of breath.  She has never required oxygen.     Dr. Olivier started her on remdesivir with no overt hypoxemia.  This medication is not necessarily indicated at this point in time  for this patient.  However, she was initiated and will continue for now.  Steroids were not initiated by Dr. Olivier and there is currently no indication to do so.  Continue adjunctive treatments.     She displayed signs of viral sepsis.  Her sinus tachycardia has improved significantly since presentation.  Her blood pressure is stable.  She remains on room air.     Reconciled and resumed home medications as appropriate.  Oral contraceptive and Maxide to be on hold at present.     Lovenox for DVT prophylaxis.     Discharge Planning: I expect the patient to be discharged to home in 1-2 days.     Electronically signed by Marc Merlos DO, 07/13/21, 14:33 CDT.                ED to Hosp-Admission (Discharged) on 7/11/2021 11/2021  5:29 AM [address] [phone]      Marc Merlos DO   Physician   Medicine   Progress Notes      Signed   Date of Service:  07/14/21 1019   Creation Time:  07/14/21 1019            Signed             Show:Clear all  [x]Manual[x]Template[x]Copied    Added by:  [x]Marc Merlos DO    []Jessy for details       [facility] Medicine Services  INPATIENT PROGRESS NOTE     Patient Name: Elena Glaser  Date of Admission: 7/11/2021  Today's Date: 07/14/21  Length of Stay: 3  Primary Care Physician: Omar Muhammad MD     Subjective   Chief Complaint: Fatigue.   HPI   Today will be day 4 of remdesivir.  Tomorrow will be the completion dose.  She did not meet specific criteria for this, but this was initiated by Dr. Olivier on 7/11 and the patient wishes to complete it.     She has been afebrile for greater than 48 hours.  She continues to not have any impairment of oxygen saturations and has never required oxygen.     She does continue to have escalating inflammatory markers.  She has also had a slight bump of her transaminases.     She also has a slight nongap metabolic acidosis that is likely secondary to recent sodium chloride administration.  Completely off of  IV fluids today.  Potassium has normalized.     Still not eating well.  Had one episode of vomiting earlier this morning.  However, loose stool seems to be improving over the last few days.     Review of Systems   All pertinent negatives and positives are as above. All other systems have been reviewed and are negative unless otherwise stated.      Objective    Temp:  [97.5 °F (36.4 °C)-99.9 °F (37.7 °C)] 97.9 °F (36.6 °C)  Heart Rate:  [] 109  Resp:  [16-20] 18  BP: (105-132)/(74-84) 118/78  Physical Exam  Constitutional:       Appearance: She is well-developed.      Comments: Up in bed.  No distress.  Watching television.  Discussed with her nurse, Cara    HENT:      Head: Normocephalic and atraumatic.   Eyes:      Conjunctiva/sclera: Conjunctivae normal.      Pupils: Pupils are equal, round, and reactive to light.   Neck:      Vascular: No JVD.   Cardiovascular:      Rate and Rhythm: Normal rate and regular rhythm.      Heart sounds: Normal heart sounds. No murmur heard.   No friction rub. No gallop.    Pulmonary:      Effort: Pulmonary effort is normal. No respiratory distress.      Breath sounds: Normal breath sounds. No wheezing or rales.      Comments: On room air with no respiratory distress.  Chest:      Chest wall: No tenderness.   Abdominal:      General: Bowel sounds are normal. There is no distension.      Palpations: Abdomen is soft.      Tenderness: There is no abdominal tenderness. There is no guarding or rebound.   Musculoskeletal:         General: No tenderness or deformity. Normal range of motion.      Cervical back: Neck supple.   Skin:     General: Skin is warm and dry.      Findings: No rash.   Neurological:      Mental Status: She is alert and oriented to person, place, and time.      Cranial Nerves: No cranial nerve deficit.      Motor: No abnormal muscle tone.      Deep Tendon Reflexes: Reflexes normal.   Psychiatric:         Behavior: Behavior normal.         Thought Content:  Thought content normal.         Judgment: Judgment normal.         Results Review:  I have reviewed the labs, radiology results, and diagnostic studies.     Laboratory Data:          Results from last 7 days   Lab Units 07/14/21 0439 07/13/21 0423 07/12/21 0242   SODIUM mmol/L 136 135* 135*   POTASSIUM mmol/L 3.9 3.3* 3.3*   CHLORIDE mmol/L 108* 104 100   CO2 mmol/L 17.0* 17.0* 22.0   BUN mg/dL 11 11 10   CREATININE mg/dL 0.86 0.86 1.06*   CALCIUM mg/dL 7.9* 8.1* 8.8   BILIRUBIN mg/dL 0.4 0.3 0.3   ALK PHOS U/L 36* 29* 32*   ALT (SGPT) U/L 52* 21 21   AST (SGOT) U/L 95* 33* 30   GLUCOSE mg/dL 76 73 96      COVID LABS:           Results From Last 14 Days   Lab Units 07/14/21 0439 07/13/21 0423 07/12/21 0242 07/11/21 0633 07/06/21 2009   PROBNP pg/mL  --   --   --  <5.0  --    CK TOTAL U/L  --   --   --  223*  --    CRP mg/dL 15.91* 13.30*  --  10.26*  --    D DIMER QUANT mg/L (FEU)  --   --  0.51* 0.53* 0.53*   FERRITIN ng/mL 2,444.00* 1,679.00*  --  1,450.00*  --    LDH U/L  --   --  369* 384*  --    PROCALCITONIN ng/mL  --   --  0.19 0.16  --    TROPONIN T ng/mL  --   --   --  <0.010  --       Culture Data:         Blood Culture   Date Value Ref Range Status   07/11/2021 No growth at less than 24 hours   Preliminary   07/11/2021 No growth at less than 24 hours   Preliminary   07/06/2021 No growth at 5 days   Final   07/06/2021 No growth at 5 days   Final      I have reviewed the patient's current medications.      Assessment/Plan           Active Hospital Problems     Diagnosis     • **Pneumonia due to COVID-19 virus     • Viral sepsis (CMS/HCC)     • Nausea, vomiting, and diarrhea     • Hyponatremia     • Hypokalemia     • Transaminitis     • Obesity (BMI 30-39.9)        Plan:  The patient was admitted on 7/11 by Dr. Olivier.  She has known COVID-19 infection.  She first tested positive for Covid on 7/3 at urgent care.  She was reevaluated in the ER on 7/6 for ongoing symptoms.  She returned to the  emergency department on 7/11 with persistent nausea and vomiting.  She was found to have an ASUNCION and a serum sodium of 129.  She was referred for admission by the emergency department.     She has most of the classic symptoms for COVID-19.  However, 1 seems to be most pronounced is the GI issues.  She has been having quite a bit of diarrhea accompanied by nausea and vomiting.  She has had a difficult time with intake.  She was found to have an ASUNCION and to be hyponatremic.  Her ASUNCION and hyponatremia have resolved.  Replaced potassium in fluids.      She has multifocal peripheral interstitial infiltrates quite atypical in distribution and appearance for COVID-19 pneumonia.  However, she has not been hypoxic.  She has no pulmonary thromboembolic disease.  She has had a cough.  She has not been short of breath at rest, but gets a bit winded at times with exertion.  She has never required oxygen.     Dr. Olivier started her on remdesivir with no overt hypoxemia.  This medication is not necessarily indicated at this point in time for this patient.  However, it was initiated and we have continued for now given her desire to do so.  Steroids were not initiated by Dr. Olivier and there is currently no indication to do so.  Continue adjunctive treatments.     Do have a bit of concern that her inflammatory markers continue to increase despite supportive care and remdesivir.  Also starting to have some elevation of her transaminases.  Continue to monitor these.  She still has not developed any significant respiratory symptoms despite having an abnormal chest x-ray.     She displayed signs of viral sepsis at presentation.  Her sinus tachycardia has improved significantly since presentation.  Her blood pressure is stable.  She remains on room air.     Reconciled and resumed home medications as appropriate.  Oral contraceptive and Maxide to be on hold at present.     Lovenox for DVT prophylaxis.     Discharge Planning: I expect the  patient to be discharged to home in 1-2 days.     Electronically signed by Marc Merlos DO, 07/14/21, 10:19 CDT.                ED to Hosp-Admission (Discharged) on 7/11/2021                     Discharge Summary      Marc Merlos DO at 07/15/21 1559                [facility] Medicine Services  DISCHARGE SUMMARY       Date of Admission: 7/11/2021  Date of Discharge:  7/15/2021  Primary Care Physician: Omar Muhammad MD    Presenting Problem/History of Present Illness:  Nausea, vomiting, diarrhea.     Final Discharge Diagnoses:  Active Hospital Problems    Diagnosis    • **Pneumonia due to COVID-19 virus    • Viral sepsis (CMS/HCC)    • Nausea, vomiting, and diarrhea    • Hyponatremia    • Hypokalemia    • Transaminitis    • Obesity (BMI 30-39.9)      Consults: None.    Procedures Performed: None.     Pertinent Test Results:   Imaging Results (All)     Procedure Component Value Units Date/Time    XR Chest 1 View [[id number]] Collected: 07/11/21 0801     Updated: 07/11/21 0804    Narrative:      EXAMINATION: XR CHEST 1 VW-     7/11/2021 6:13 AM CDT     HISTORY: Shortness of breath.     1 view chest x-ray compared with July 6, 2021.     The heart and mediastinum are within normal limits.     Slightly lower lung volumes with mild interstitial infiltrate now  present in both lower lobes.     No pneumothorax.     Summary:  1. Mild interstitial infiltrate now present in both lower lobes.     This report was finalized on 07/11/2021 08:01 by Dr. Ben Connell MD.        LAB RESULTS:      Lab 07/15/21  0423 07/14/21  0439 07/13/21  0423 07/12/21  0242 07/11/21  0633   WBC 11.66*  --  8.54 7.27 9.80   HEMOGLOBIN 11.2*  --  11.7* 12.8 15.3   HEMATOCRIT 32.2*  --  32.4* 34.8 41.9   PLATELETS 390  --  297 285 291   NEUTROS ABS  --   --  6.38 4.98 8.11*   IMMATURE GRANS (ABS)  --   --   --  0.11* 0.14*   LYMPHS ABS  --   --   --  1.65 1.00   MONOS ABS  --   --   --  0.51 0.51   EOS ABS   --   --   --  0.00 0.00   MCV 78.0*  --  77.1* 77.0* 76.2*   CRP 17.30* 15.91* 13.30*  --  10.26*   PROCALCITONIN  --   --   --  0.19 0.16   LDH  --   --   --  369* 384*         Lab 07/15/21  0423 07/14/21  0439 07/13/21  0423 07/12/21  0242 07/11/21  0633   SODIUM 136 136 135* 135* 129*   POTASSIUM 3.6 3.9 3.3* 3.3* 3.2*   CHLORIDE 107 108* 104 100 94*   CO2 18.0* 17.0* 17.0* 22.0 17.0*   ANION GAP 11.0 11.0 14.0 13.0 18.0*   BUN 8 11 11 10 19   CREATININE 0.81 0.86 0.86 1.06* 1.27*   GLUCOSE 85 76 73 96 87   CALCIUM 8.2* 7.9* 8.1* 8.8 9.0   MAGNESIUM  --   --   --  1.8 2.2   PHOSPHORUS  --   --   --   --  2.0*         Lab 07/15/21  0423 07/14/21  0439 07/13/21  0423 07/12/21  0242 07/11/21  0633   TOTAL PROTEIN 6.2 5.8* 6.1 6.8 8.1   ALBUMIN 3.00* 3.00* 3.10* 3.60 4.20   GLOBULIN 3.2 2.8 3.0 3.2 3.9   ALT (SGPT) 92* 52* 21 21 22   AST (SGOT) 101* 95* 33* 30 32   BILIRUBIN 0.7 0.4 0.3 0.3 0.5   ALK PHOS 53 36* 29* 32* 37*   LIPASE  --  75*  --   --  62*         Lab 07/11/21 0633   PROBNP <5.0   TROPONIN T <0.010             Lab 07/15/21  0423 07/11/21  0633   FERRITIN 2,332.00* 1,450.00*   ABO TYPING  --  A   RH TYPING  --  Positive   ANTIBODY SCREEN  --  Negative         Lab 07/11/21 0626   PH, ARTERIAL 7.539*   PCO2, ARTERIAL 20.4*   PO2 ART 67.9*   O2 SATURATION ART 95.7   HCO3 ART 17.4*   BASE EXCESS ART -2.7*     Brief Urine Lab Results  (Last result in the past 365 days)      Color   Clarity   Blood   Leuk Est   Nitrite   Protein   CREAT   Urine HCG        07/11/21 0810 Yellow Clear Small (1+) Negative Negative Negative             Microbiology Results (last 10 days)     Procedure Component Value - Date/Time    Respiratory Panel, PCR (WITHOUT COVID) - Swab, Nasopharynx [[id number]]  (Normal) Collected: 07/11/21 8068    Lab Status: Final result Specimen: Swab from Nasopharynx Updated: 07/11/21 2007     ADENOVIRUS, PCR Not Detected     Coronavirus 229E Not Detected     Coronavirus HKU1 Not Detected      Coronavirus NL63 Not Detected     Coronavirus OC43 Not Detected     Human Metapneumovirus Not Detected     Human Rhinovirus/Enterovirus Not Detected     Influenza B PCR Not Detected     Parainfluenza Virus 1 Not Detected     Parainfluenza Virus 2 Not Detected     Parainfluenza Virus 3 Not Detected     Parainfluenza Virus 4 Not Detected     Bordetella pertussis pcr Not Detected     Chlamydophila pneumoniae PCR Not Detected     Mycoplasma pneumo by PCR Not Detected     Influenza A PCR Not Detected     RSV, PCR Not Detected     Bordetella parapertussis PCR Not Detected    Narrative:      The coronavirus on the RVP is NOT COVID-19 and is NOT indicative of infection with COVID-19.    In the setting of a positive respiratory panel with a viral infection PLUS a negative procalcitonin without other underlying concern for bacterial infection, consider observing off antibiotics or discontinuation of antibiotics and continue supportive care. If the respiratory panel is positive for atypical bacterial infection (Bordetella pertussis, Chlamydophila pneumoniae, or Mycoplasma pneumoniae), consider antibiotic de-escalation to target atypical bacterial infection.    Legionella Antigen, Urine - Urine, Urine, Clean Catch [[id number]]  (Normal) Collected: 07/11/21 1851    Lab Status: Final result Specimen: Urine, Clean Catch Updated: 07/11/21 1944     LEGIONELLA ANTIGEN, URINE Negative    S. Pneumo Ag Urine or CSF - Urine, Urine, Clean Catch [[id number]]  (Normal) Collected: 07/11/21 1851    Lab Status: Final result Specimen: Urine, Clean Catch Updated: 07/11/21 1945     Strep Pneumo Ag Negative    Blood Culture With RHEA - Blood, Arm, Left [[id number]] Collected: 07/11/21 1842    Lab Status: Preliminary result Specimen: Blood from Arm, Left Updated: 07/14/21 1930     Blood Culture No growth at 3 days    Blood Culture With RHEA - Blood, Arm, Right [[id number]] Collected: 07/11/21 1842    Lab Status: Preliminary result Specimen: Blood from  Arm, Right Updated: 07/14/21 1930     Blood Culture No growth at 3 days    Blood Culture - Blood, Arm, Left [[id number]] Collected: 07/06/21 2009    Lab Status: Final result Specimen: Blood from Arm, Left Updated: 07/11/21 2130     Blood Culture No growth at 5 days    Blood Culture - Blood, Arm, Right [[id number]] Collected: 07/06/21 2009    Lab Status: Final result Specimen: Blood from Arm, Right Updated: 07/11/21 2130     Blood Culture No growth at 5 days        Results From Last 14 Days   Lab Units 07/15/21  0423 07/14/21  0439 07/13/21  0423 07/12/21  0242 07/11/21  0633 07/06/21 2009   PROBNP pg/mL  --   --   --   --  <5.0  --    CK TOTAL U/L  --   --   --   --  223*  --    CRP mg/dL 17.30* 15.91* 13.30*  --  10.26*  --    D DIMER QUANT mg/L (FEU)  --   --   --  0.51* 0.53* 0.53*   FERRITIN ng/mL 2,332.00* 2,444.00* 1,679.00*  --  1,450.00*  --    LDH U/L  --   --   --  369* 384*  --    PROCALCITONIN ng/mL  --   --   --  0.19 0.16  --    TROPONIN T ng/mL  --   --   --   --  <0.010  --      Hospital Course:  The patient was admitted on 7/11 by Dr. Olivier.  She has known COVID-19 infection.  She first tested positive for Covid on 7/3 at urgent care.  She was reevaluated in the ER on 7/6 for ongoing symptoms.  She returned to the emergency department on 7/11 with persistent nausea and vomiting.  She was found to have an ASUNCION and a serum sodium of 129.  She was referred for admission by the emergency department.     She has most of the classic symptoms for COVID-19.  However, 1 seems to be most pronounced is the GI issues.  She has been having quite a bit of diarrhea accompanied by nausea and vomiting.  She has had a difficult time with intake.  She was found to have an ASUNCION and to be hyponatremic.  Her ASUNCION and hyponatremia have resolved.  Replaced potassium in fluids.      She had multifocal peripheral interstitial infiltrates quite atypical in distribution and appearance for COVID-19 pneumonia on CXR.  She has no  pulmonary thromboembolic disease.  She has had a cough.  She has not been short of breath at rest, but gets a bit winded at times with exertion.  She has never been hypoxic.  We even did a walking oximetry this morning in the room that was fairly vigorous.  She never had a saturation below 95%. She has never required oxygen.     Dr. Olivier started her on remdesivir with no overt hypoxemia.  This medication is not necessarily indicated at this point in time for this patient.  However, it was initiated and we have continued for now given her desire to do so. Today is day 5. Steroids were not initiated by Dr. Olivier and there is currently no indication to do so.  Continue adjunctive treatments.     Her inflammatory marker trend has been of concern to me.  Her CRP was 10.26 at presentation and was 17.3 today.  Her ferritin finally started declining today after plateauing yesterday.  She did develop some elevated transaminases that could be secondary to remdesivir or her viral illness in general.  She did not receive atorvastatin.  Would recommend that Dr. Muhammad repeat a CMP and inflammatory markers when he sees her after being cleared by the health department.     She displayed signs of viral sepsis at presentation.  Her sinus tachycardia has improved since presentation.  Her blood pressure is stable.  She remains on room air.     Reconciled and resumed home medications as appropriate.  Oral contraceptive and Maxide to be on hold at present.     Lovenox was used for DVT prophylaxis.    She very much wants to go home today.  She has had no vomiting in 24 hours.  She states she is tolerating diet better.  She has not had a loose stool today.  I think she does look quite a bit better.  She denies being short of breath even with exertion.  She has not been coughing very much.  Her T-max over the last 48 hours is 100.0.  We talked about things that would bring her back to the emergency department that include new, persistent  "fever or the development of hypoxemia.  We obtained a pulse oximeter from  and I instructed her on using it several times a day, especially if she feels short of breath.  I told her that persistent saturations between 90-94% would be an indicator for her to seek medical attention once again.    Physical Exam on Discharge:  /83 (BP Location: Left arm, Patient Position: Sitting)   Pulse 105   Temp 97.6 °F (36.4 °C) (Oral)   Resp 18   Ht 152.4 cm (60\")   Wt 74.5 kg (164 lb 4 oz)   LMP 06/29/2021   SpO2 97%   BMI 32.08 kg/m²   Physical Exam  Constitutional:       Appearance: She is well-developed.      Comments: Up and ambulatory to bathroom when I first came in.  No distress. Looks much better than she has. Discussed with her nurse, Мария.    HENT:      Head: Normocephalic and atraumatic.   Eyes:      Conjunctiva/sclera: Conjunctivae normal.      Pupils: Pupils are equal, round, and reactive to light.   Neck:      Vascular: No JVD.   Cardiovascular:      Rate and Rhythm: Slight sinus tachycardia with activity.      Heart sounds: Normal heart sounds.   Pulmonary:      Effort: Pulmonary effort is normal. No respiratory distress.       Comments: On room air with no respiratory distress.   Musculoskeletal:         General: No tenderness or deformity. Normal range of motion.      Cervical back: Neck supple.   Skin:     General: Skin is warm and dry.      Findings: No rash.   Neurological:      Mental Status: She is alert and oriented to person, place, and time.      Cranial Nerves: No cranial nerve deficit.      Motor: No abnormal muscle tone.      Deep Tendon Reflexes: Reflexes normal.   Psychiatric:         Behavior: Behavior normal.         Thought Content: Thought content normal.         Judgment: Judgment normal.     Condition on Discharge: Stable for home care and I believe her to be reliable for self monitoring of recurrent fevers or developing hypoxia.     Discharge Disposition:  Home " or Self Care    Discharge Medications:     Discharge Medications      New Medications      Instructions Start Date   ondansetron ODT 4 MG disintegrating tablet  Commonly known as: Zofran ODT   4 mg, Translingual, Every 8 Hours PRN         Continue These Medications      Instructions Start Date   benzonatate 200 MG capsule  Commonly known as: TESSALON   200 mg, Oral, 3 Times Daily PRN      Lessina 0.1-20 MG-MCG per tablet  Generic drug: levonorgestrel-ethinyl estradiol   1 tablet, Oral, Daily      promethazine 25 MG tablet  Commonly known as: PHENERGAN   25 mg, Oral, Every 6 Hours PRN         Stop These Medications    triamterene-hydrochlorothiazide 37.5-25 MG per tablet  Commonly known as: MAXZIDE-25          Discharge Diet:   Diet Instructions     Advance Diet As Tolerated          Activity at Discharge:   Activity Instructions     Activity as Tolerated          Follow-up Appointments:   Dr. Muhammad when released by the health department. Recommend repeat CMP and inflammatory markers at that time.     Test Results Pending at Discharge: None.     Electronically signed by Marc Merlos DO, 07/15/21, 15:59 CDT.    Time: 35 minutes.           Electronically signed by Marc Merlos DO at 07/15/21 2310

## 2021-07-16 NOTE — PAYOR COMM NOTE
"REF:[id number]    [facility]  JASVIR  [phone]  OR  FAX  [phone]       Elena Patel (35 y.o. Female)     Date of Birth Social Security Number Address Home Phone MRN    1985  [address]  [address]  [facility] [address] [phone] [id number]    [employer] Marital Status          Other Single       Admission Date Admission Type Admitting Provider Attending Provider Department, Room/Bed    7/11/21 Emergency Marc Merlos DO  [facility] 4B, 443/1    Discharge Date Discharge Disposition Discharge Destination        7/15/2021 Home or Self Care Home             Attending Provider: (none)   Allergies: No Known Allergies    Isolation: None   Infection: COVID (confirmed) (07/03/21)   Code Status: Prior    Ht: 152.4 cm (60\")   Wt: 74.5 kg (164 lb 4 oz)    Admission Cmt: None   Principal Problem: Pneumonia due to COVID-19 virus [U07.1,J12.82]                 Active Insurance as of 7/11/2021     Primary Coverage     Payor Plan Insurance Group Employer/Plan Group    [employer] [place] [employer]UE [employer] PPO [id number]     Payor Plan Address Payor Plan Phone Number Payor Plan Fax Number Effective Dates    [address] [phone]  1/1/2013 - None Entered    [facility] [address]       Subscriber Name Subscriber Birth Date Member ID       ELENA PATEL 1985 [id number]                 Emergency Contacts      (Rel.) Home Phone Work Phone Mobile Phone    JUWAN RAMEY (Mother) [phone] -- --               Discharge Summary      Marc Merlos DO at 07/15/21 1559                [facility] Medicine Services  DISCHARGE SUMMARY       Date of Admission: 7/11/2021  Date of Discharge:  7/15/2021  Primary Care Physician: Omar Muhammad MD    Presenting Problem/History of Present Illness:  Nausea, vomiting, diarrhea.     Final Discharge Diagnoses:  Active Hospital Problems    Diagnosis    • **Pneumonia due to " COVID-19 virus    • Viral sepsis (CMS/HCC)    • Nausea, vomiting, and diarrhea    • Hyponatremia    • Hypokalemia    • Transaminitis    • Obesity (BMI 30-39.9)      Consults: None.    Procedures Performed: None.     Pertinent Test Results:   Imaging Results (All)     Procedure Component Value Units Date/Time    XR Chest 1 View [[id number]] Collected: 07/11/21 0801     Updated: 07/11/21 0804    Narrative:      EXAMINATION: XR CHEST 1 VW-     7/11/2021 6:13 AM CDT     HISTORY: Shortness of breath.     1 view chest x-ray compared with July 6, 2021.     The heart and mediastinum are within normal limits.     Slightly lower lung volumes with mild interstitial infiltrate now  present in both lower lobes.     No pneumothorax.     Summary:  1. Mild interstitial infiltrate now present in both lower lobes.     This report was finalized on 07/11/2021 08:01 by Dr. Ben Connell MD.        LAB RESULTS:      Lab 07/15/21  0423 07/14/21  0439 07/13/21  0423 07/12/21  0242 07/11/21  0633   WBC 11.66*  --  8.54 7.27 9.80   HEMOGLOBIN 11.2*  --  11.7* 12.8 15.3   HEMATOCRIT 32.2*  --  32.4* 34.8 41.9   PLATELETS 390  --  297 285 291   NEUTROS ABS  --   --  6.38 4.98 8.11*   IMMATURE GRANS (ABS)  --   --   --  0.11* 0.14*   LYMPHS ABS  --   --   --  1.65 1.00   MONOS ABS  --   --   --  0.51 0.51   EOS ABS  --   --   --  0.00 0.00   MCV 78.0*  --  77.1* 77.0* 76.2*   CRP 17.30* 15.91* 13.30*  --  10.26*   PROCALCITONIN  --   --   --  0.19 0.16   LDH  --   --   --  369* 384*         Lab 07/15/21  0423 07/14/21  0439 07/13/21  0423 07/12/21  0242 07/11/21  0633   SODIUM 136 136 135* 135* 129*   POTASSIUM 3.6 3.9 3.3* 3.3* 3.2*   CHLORIDE 107 108* 104 100 94*   CO2 18.0* 17.0* 17.0* 22.0 17.0*   ANION GAP 11.0 11.0 14.0 13.0 18.0*   BUN 8 11 11 10 19   CREATININE 0.81 0.86 0.86 1.06* 1.27*   GLUCOSE 85 76 73 96 87   CALCIUM 8.2* 7.9* 8.1* 8.8 9.0   MAGNESIUM  --   --   --  1.8 2.2   PHOSPHORUS  --   --   --   --  2.0*         Lab  07/15/21  0423 07/14/21  0439 07/13/21  0423 07/12/21  0242 07/11/21  0633   TOTAL PROTEIN 6.2 5.8* 6.1 6.8 8.1   ALBUMIN 3.00* 3.00* 3.10* 3.60 4.20   GLOBULIN 3.2 2.8 3.0 3.2 3.9   ALT (SGPT) 92* 52* 21 21 22   AST (SGOT) 101* 95* 33* 30 32   BILIRUBIN 0.7 0.4 0.3 0.3 0.5   ALK PHOS 53 36* 29* 32* 37*   LIPASE  --  75*  --   --  62*         Lab 07/11/21  0633   PROBNP <5.0   TROPONIN T <0.010             Lab 07/15/21  0423 07/11/21  0633   FERRITIN 2,332.00* 1,450.00*   ABO TYPING  --  A   RH TYPING  --  Positive   ANTIBODY SCREEN  --  Negative         Lab 07/11/21  0626   PH, ARTERIAL 7.539*   PCO2, ARTERIAL 20.4*   PO2 ART 67.9*   O2 SATURATION ART 95.7   HCO3 ART 17.4*   BASE EXCESS ART -2.7*     Brief Urine Lab Results  (Last result in the past 365 days)      Color   Clarity   Blood   Leuk Est   Nitrite   Protein   CREAT   Urine HCG        07/11/21 0810 Yellow Clear Small (1+) Negative Negative Negative             Microbiology Results (last 10 days)     Procedure Component Value - Date/Time    Respiratory Panel, PCR (WITHOUT COVID) - Swab, Nasopharynx [[id number]]  (Normal) Collected: 07/11/21 9921    Lab Status: Final result Specimen: Swab from Nasopharynx Updated: 07/11/21 2007     ADENOVIRUS, PCR Not Detected     Coronavirus 229E Not Detected     Coronavirus HKU1 Not Detected     Coronavirus NL63 Not Detected     Coronavirus OC43 Not Detected     Human Metapneumovirus Not Detected     Human Rhinovirus/Enterovirus Not Detected     Influenza B PCR Not Detected     Parainfluenza Virus 1 Not Detected     Parainfluenza Virus 2 Not Detected     Parainfluenza Virus 3 Not Detected     Parainfluenza Virus 4 Not Detected     Bordetella pertussis pcr Not Detected     Chlamydophila pneumoniae PCR Not Detected     Mycoplasma pneumo by PCR Not Detected     Influenza A PCR Not Detected     RSV, PCR Not Detected     Bordetella parapertussis PCR Not Detected    Narrative:      The coronavirus on the RVP is NOT COVID-19 and  is NOT indicative of infection with COVID-19.    In the setting of a positive respiratory panel with a viral infection PLUS a negative procalcitonin without other underlying concern for bacterial infection, consider observing off antibiotics or discontinuation of antibiotics and continue supportive care. If the respiratory panel is positive for atypical bacterial infection (Bordetella pertussis, Chlamydophila pneumoniae, or Mycoplasma pneumoniae), consider antibiotic de-escalation to target atypical bacterial infection.    Legionella Antigen, Urine - Urine, Urine, Clean Catch [[id number]]  (Normal) Collected: 07/11/21 1851    Lab Status: Final result Specimen: Urine, Clean Catch Updated: 07/11/21 1944     LEGIONELLA ANTIGEN, URINE Negative    S. Pneumo Ag Urine or CSF - Urine, Urine, Clean Catch [[id number]]  (Normal) Collected: 07/11/21 1851    Lab Status: Final result Specimen: Urine, Clean Catch Updated: 07/11/21 1945     Strep Pneumo Ag Negative    Blood Culture With RHEA - Blood, Arm, Left [[id number]] Collected: 07/11/21 1842    Lab Status: Preliminary result Specimen: Blood from Arm, Left Updated: 07/14/21 1930     Blood Culture No growth at 3 days    Blood Culture With RHEA - Blood, Arm, Right [[id number]] Collected: 07/11/21 1842    Lab Status: Preliminary result Specimen: Blood from Arm, Right Updated: 07/14/21 1930     Blood Culture No growth at 3 days    Blood Culture - Blood, Arm, Left [[id number]] Collected: 07/06/21 2009    Lab Status: Final result Specimen: Blood from Arm, Left Updated: 07/11/21 2130     Blood Culture No growth at 5 days    Blood Culture - Blood, Arm, Right [[id number]] Collected: 07/06/21 2009    Lab Status: Final result Specimen: Blood from Arm, Right Updated: 07/11/21 2130     Blood Culture No growth at 5 days        Results From Last 14 Days   Lab Units 07/15/21  0423 07/14/21  0439 07/13/21  0423 07/12/21  0242 07/11/21  0633 07/06/21 2009   PROBNP pg/mL  --   --   --   --  <5.0  --     CK TOTAL U/L  --   --   --   --  223*  --    CRP mg/dL 17.30* 15.91* 13.30*  --  10.26*  --    D DIMER QUANT mg/L (FEU)  --   --   --  0.51* 0.53* 0.53*   FERRITIN ng/mL 2,332.00* 2,444.00* 1,679.00*  --  1,450.00*  --    LDH U/L  --   --   --  369* 384*  --    PROCALCITONIN ng/mL  --   --   --  0.19 0.16  --    TROPONIN T ng/mL  --   --   --   --  <0.010  --      Hospital Course:  The patient was admitted on 7/11 by Dr. Olivier.  She has known COVID-19 infection.  She first tested positive for Covid on 7/3 at urgent care.  She was reevaluated in the ER on 7/6 for ongoing symptoms.  She returned to the emergency department on 7/11 with persistent nausea and vomiting.  She was found to have an ASUNCION and a serum sodium of 129.  She was referred for admission by the emergency department.     She has most of the classic symptoms for COVID-19.  However, 1 seems to be most pronounced is the GI issues.  She has been having quite a bit of diarrhea accompanied by nausea and vomiting.  She has had a difficult time with intake.  She was found to have an ASUNCION and to be hyponatremic.  Her ASUNCION and hyponatremia have resolved.  Replaced potassium in fluids.      She had multifocal peripheral interstitial infiltrates quite atypical in distribution and appearance for COVID-19 pneumonia on CXR.  She has no pulmonary thromboembolic disease.  She has had a cough.  She has not been short of breath at rest, but gets a bit winded at times with exertion.  She has never been hypoxic.  We even did a walking oximetry this morning in the room that was fairly vigorous.  She never had a saturation below 95%. She has never required oxygen.     Dr. Olivier started her on remdesivir with no overt hypoxemia.  This medication is not necessarily indicated at this point in time for this patient.  However, it was initiated and we have continued for now given her desire to do so. Today is day 5. Steroids were not initiated by Dr. Olivier and there is  "currently no indication to do so.  Continue adjunctive treatments.     Her inflammatory marker trend has been of concern to me.  Her CRP was 10.26 at presentation and was 17.3 today.  Her ferritin finally started declining today after plateauing yesterday.  She did develop some elevated transaminases that could be secondary to remdesivir or her viral illness in general.  She did not receive atorvastatin.  Would recommend that Dr. Muhammad repeat a CMP and inflammatory markers when he sees her after being cleared by the health department.     She displayed signs of viral sepsis at presentation.  Her sinus tachycardia has improved since presentation.  Her blood pressure is stable.  She remains on room air.     Reconciled and resumed home medications as appropriate.  Oral contraceptive and Maxide to be on hold at present.     Lovenox was used for DVT prophylaxis.    She very much wants to go home today.  She has had no vomiting in 24 hours.  She states she is tolerating diet better.  She has not had a loose stool today.  I think she does look quite a bit better.  She denies being short of breath even with exertion.  She has not been coughing very much.  Her T-max over the last 48 hours is 100.0.  We talked about things that would bring her back to the emergency department that include new, persistent fever or the development of hypoxemia.  We obtained a pulse oximeter from  and I instructed her on using it several times a day, especially if she feels short of breath.  I told her that persistent saturations between 90-94% would be an indicator for her to seek medical attention once again.    Physical Exam on Discharge:  /83 (BP Location: Left arm, Patient Position: Sitting)   Pulse 105   Temp 97.6 °F (36.4 °C) (Oral)   Resp 18   Ht 152.4 cm (60\")   Wt 74.5 kg (164 lb 4 oz)   LMP 06/29/2021   SpO2 97%   BMI 32.08 kg/m²   Physical Exam  Constitutional:       Appearance: She is well-developed.      " Comments: Up and ambulatory to bathroom when I first came in.  No distress. Looks much better than she has. Discussed with her nurse, Мария.    HENT:      Head: Normocephalic and atraumatic.   Eyes:      Conjunctiva/sclera: Conjunctivae normal.      Pupils: Pupils are equal, round, and reactive to light.   Neck:      Vascular: No JVD.   Cardiovascular:      Rate and Rhythm: Slight sinus tachycardia with activity.      Heart sounds: Normal heart sounds.   Pulmonary:      Effort: Pulmonary effort is normal. No respiratory distress.       Comments: On room air with no respiratory distress.   Musculoskeletal:         General: No tenderness or deformity. Normal range of motion.      Cervical back: Neck supple.   Skin:     General: Skin is warm and dry.      Findings: No rash.   Neurological:      Mental Status: She is alert and oriented to person, place, and time.      Cranial Nerves: No cranial nerve deficit.      Motor: No abnormal muscle tone.      Deep Tendon Reflexes: Reflexes normal.   Psychiatric:         Behavior: Behavior normal.         Thought Content: Thought content normal.         Judgment: Judgment normal.     Condition on Discharge: Stable for home care and I believe her to be reliable for self monitoring of recurrent fevers or developing hypoxia.     Discharge Disposition:  Home or Self Care    Discharge Medications:     Discharge Medications      New Medications      Instructions Start Date   ondansetron ODT 4 MG disintegrating tablet  Commonly known as: Zofran ODT   4 mg, Translingual, Every 8 Hours PRN         Continue These Medications      Instructions Start Date   benzonatate 200 MG capsule  Commonly known as: TESSALON   200 mg, Oral, 3 Times Daily PRN      Lessina 0.1-20 MG-MCG per tablet  Generic drug: levonorgestrel-ethinyl estradiol   1 tablet, Oral, Daily      promethazine 25 MG tablet  Commonly known as: PHENERGAN   25 mg, Oral, Every 6 Hours PRN         Stop These Medications     triamterene-hydrochlorothiazide 37.5-25 MG per tablet  Commonly known as: MAXZIDE-25          Discharge Diet:   Diet Instructions     Advance Diet As Tolerated          Activity at Discharge:   Activity Instructions     Activity as Tolerated          Follow-up Appointments:   Dr. Muhammad when released by the health department. Recommend repeat CMP and inflammatory markers at that time.     Test Results Pending at Discharge: None.     Electronically signed by Miriam Merlos DO, 07/15/21, 15:59 CDT.    Time: 35 minutes.           Electronically signed by Miriam Merlos DO at 07/15/21 1608       Discharge Order (From admission, onward)     Start     Ordered    07/15/21 1557  Discharge patient  Once     Expected Discharge Date: 07/15/21    Expected Discharge Time: Evening    Discharge Disposition: Home or Self Care    Physician of Record for Attribution - Please select from Treatment Team: MIRIAM MERLOS [[id number]]    Review needed by CMO to determine Physician of Record: No       Question Answer Comment   Physician of Record for Attribution - Please select from Treatment Team MIRIAM MERLOS    Review needed by CMO to determine Physician of Record No        07/15/21 1552

## 2021-07-17 ENCOUNTER — READMISSION MANAGEMENT (OUTPATIENT)
Dept: CALL CENTER | Facility: HOSPITAL | Age: 36
End: 2021-07-17

## 2021-07-17 NOTE — OUTREACH NOTE
COVID-19 Week 1 Survey      Responses   [facility] [place] patient discharged from?  [facility]   Does the patient have one of the following disease processes/diagnoses(primary or secondary)?  COVID-19   COVID-19 underlying condition?  None   Call Number  Call 2   Week 1 Call successful?  Yes   Call start time  1303   Call end time  1308   Discharge diagnosis  Pneumonia due to COVID-19 virus   Meds reviewed with patient/caregiver?  Yes   Is the patient having any side effects they believe may be caused by any medication additions or changes?  No   Does the patient have all medications ordered at discharge?  Yes   Is the patient taking all medications as directed (includes completed medication regime)?  Yes   Does the patient have a primary care provider?   Yes   Comments regarding PCP  was told by PCP to wait until Health Dept called her to see when she will be out of quarantine, before scheduling appt with PCP.   Does the patient have an appointment with their PCP or specialist within 7 days of discharge?  No   What is preventing the patient from scheduling follow up appointments within 7 days of discharge?  Waiting on return call   Has the patient kept scheduled appointments due by today?  N/A   Psychosocial issues?  No   Did the patient receive a copy of their discharge instructions?  Yes   Did the patient receive a copy of COVID-19 specific instructions?  Yes   Nursing interventions  Reviewed instructions with patient   What is the patient's perception of their health status since discharge?  Improving   Does the patient have any of the following symptoms?  -- [last fever was thursday night.]   Pulse Ox monitoring  Intermittent   Pulse Ox device source  Patient   O2 Sat comments  96% RA   O2 Sat: education provided  Sat levels, Monitoring frequency, When to seek care   Is the patient/caregiver able to teach back steps to recovery at home?  Set small, achievable goals for return to baseline health, Rest and rebuild  strength, gradually increase activity, Eat a well-balance diet   If the patient is a current smoker, are they able to teach back resources for cessation?  Not a smoker   Is the patient/caregiver able to teach back the hierarchy of who to call/visit for symptoms/problems? PCP, Specialist, Home health nurse, Urgent Care, ED, 911  Yes   COVID-19 call completed?  Yes   Wrap up additional comments  Pt is occasionally having a productive cough.  staying with her mother currently.           Karmen Knapp RN

## 2021-07-18 ENCOUNTER — READMISSION MANAGEMENT (OUTPATIENT)
Dept: CALL CENTER | Facility: HOSPITAL | Age: 36
End: 2021-07-18

## 2021-07-18 NOTE — OUTREACH NOTE
COVID-19 Week 1 Survey      Responses   [facility] patient discharged from?  [place]   Does the patient have one of the following disease processes/diagnoses(primary or secondary)?  COVID-19   COVID-19 underlying condition?  None   Call Number  Call 3   Week 1 Call successful?  Yes   Call start time  1655   Call end time  1658   Discharge diagnosis  Pneumonia due to COVID-19 virus   Meds reviewed with patient/caregiver?  Yes   Is the patient having any side effects they believe may be caused by any medication additions or changes?  No   Does the patient have all medications ordered at discharge?  Yes   Is the patient taking all medications as directed (includes completed medication regime)?  Yes   Does the patient have a primary care provider?   Yes   Does the patient have an appointment with their PCP or specialist within 7 days of discharge?  No   Has the patient kept scheduled appointments due by today?  N/A   Has home health visited the patient within 72 hours of discharge?  N/A   Psychosocial issues?  No   Comments  Sleeping better, good appetite.   Did the patient receive a copy of their discharge instructions?  Yes   Did the patient receive a copy of COVID-19 specific instructions?  Yes   Nursing interventions  Reviewed instructions with patient   What is the patient's perception of their health status since discharge?  Improving   Does the patient have any of the following symptoms?  None   Nursing Interventions  Nurse provided patient education   Pulse Ox monitoring  Intermittent   Pulse Ox device source  Patient   O2 Sat comments  97% on RA   O2 Sat: education provided  Sat levels, Monitoring frequency, When to seek care   O2 Sat education comments  If 90% or below and stays there, call 911   Is the patient/caregiver able to teach back steps to recovery at home?  Set small, achievable goals for return to baseline health, Rest and rebuild strength, gradually increase activity, Eat a well-balance diet,  Make a list of questions for provider's appointment   If the patient is a current smoker, are they able to teach back resources for cessation?  Not a smoker   Is the patient/caregiver able to teach back the hierarchy of who to call/visit for symptoms/problems? PCP, Specialist, Home health nurse, Urgent Care, ED, 911  Yes   COVID-19 call completed?  Yes   Wrap up additional comments  Still with slight cough, but feeling better she says.          Es George RN

## 2021-07-21 LAB
ALBUMIN SERPL-MCNC: 3.9 G/DL (ref 3.5–5.2)
ALP BLD-CCNC: 59 U/L (ref 35–104)
ALT SERPL-CCNC: 57 U/L (ref 5–33)
ANION GAP SERPL CALCULATED.3IONS-SCNC: 12 MMOL/L (ref 7–19)
AST SERPL-CCNC: 35 U/L (ref 5–32)
BILIRUB SERPL-MCNC: 0.7 MG/DL (ref 0.2–1.2)
BUN BLDV-MCNC: 11 MG/DL (ref 6–20)
CALCIUM SERPL-MCNC: 9.6 MG/DL (ref 8.6–10)
CHLORIDE BLD-SCNC: 105 MMOL/L (ref 98–111)
CO2: 24 MMOL/L (ref 22–29)
CREAT SERPL-MCNC: 0.8 MG/DL (ref 0.5–0.9)
GFR AFRICAN AMERICAN: >59
GFR NON-AFRICAN AMERICAN: >60
GLUCOSE BLD-MCNC: 135 MG/DL (ref 74–109)
POTASSIUM SERPL-SCNC: 4.2 MMOL/L (ref 3.5–5)
SODIUM BLD-SCNC: 141 MMOL/L (ref 136–145)
TOTAL PROTEIN: 8 G/DL (ref 6.6–8.7)

## 2021-07-22 ENCOUNTER — READMISSION MANAGEMENT (OUTPATIENT)
Dept: CALL CENTER | Facility: HOSPITAL | Age: 36
End: 2021-07-22

## 2021-07-22 NOTE — OUTREACH NOTE
COVID-19 Week 2 Survey      Responses   [facility] patient discharged from?  [place]   Does the patient have one of the following disease processes/diagnoses(primary or secondary)?  COVID-19   COVID-19 underlying condition?  None   Call Number  Call 1   COVID-19 Week 2: Call 1 attempt successful?  Yes   Call start time  0848   Call end time  0853   Discharge diagnosis  Pneumonia due to COVID-19 virus   Is patient permission given to speak with other caregiver?  Yes   List who call center can speak with  Mother   Meds reviewed with patient/caregiver?  Yes   Is the patient having any side effects they believe may be caused by any medication additions or changes?  No   Does the patient have all medications ordered at discharge?  Yes   Is the patient taking all medications as directed (includes completed medication regime)?  Yes   Does the patient have a primary care provider?   Yes   Comments regarding PCP  She saw Dr. Muhammad yesterday- She is out of quarantine    Does the patient have an appointment with their PCP or specialist within 7 days of discharge?  Greater than 7 days   What is preventing the patient from scheduling follow up appointments within 7 days of discharge?  Earlier appointment not available   Nursing Interventions  Verified appointment date/time/provider   Has the patient kept scheduled appointments due by today?  Yes   Has home health visited the patient within 72 hours of discharge?  N/A   Psychosocial issues?  No   Comments  She is doing better. Eating well.    Did the patient receive a copy of their discharge instructions?  Yes   Did the patient receive a copy of COVID-19 specific instructions?  Yes   Nursing interventions  Reviewed instructions with patient, Educated on MyChart   What is the patient's perception of their health status since discharge?  Improving [She is doing very well. ]   Does the patient have any of the following symptoms?  None   Nursing Interventions  Nurse provided  patient education   Pulse Ox monitoring  Intermittent   Pulse Ox device source  Patient   O2 Sat comments  97% on room air.    O2 Sat: education provided  Sat levels, Monitoring frequency, When to seek care   O2 Sat education comments  If 90% or below and stays there, call 911   Is the patient/caregiver able to teach back steps to recovery at home?  Set small, achievable goals for return to baseline health, Rest and rebuild strength, gradually increase activity, Eat a well-balance diet, Make a list of questions for provider's appointment   If the patient is a current smoker, are they able to teach back resources for cessation?  Not a smoker   Is the patient/caregiver able to teach back the hierarchy of who to call/visit for symptoms/problems? PCP, Specialist, Home health nurse, Urgent Care, ED, 911  Yes   COVID-19 call completed?  Yes   Revoked  No further contact(revokes)-requires comment   Is the patient interested in additional calls from an ambulatory ?  NOTE:  applies to high risk patients requiring additional follow-up.  No   Wrap up additional comments  She is doing well still has a cough that is scant, doing well going back to work.           Martha Diaz RN

## 2021-07-25 ENCOUNTER — NURSE TRIAGE (OUTPATIENT)
Dept: CALL CENTER | Facility: HOSPITAL | Age: 36
End: 2021-07-25

## 2021-07-26 NOTE — TELEPHONE ENCOUNTER
\[facility]\"" she just got over covid. \[facility]\"" she was admitted for five days. She was released by the health department on Monday, saw Dr. Muhammad on Wednesday. \[facility]\"" blood work came back good, she is feeling good.     \[facility]\"" nephew tested positive on Friday after being exposed at the [employer]. She was around him on Thursday. States just wants to make sure she is okay?     Discussed less than 10 minutes and less than six feet, should be not considered exposure. Explained she should also have antibodies as she just had covid.     Reason for Disposition  • [1] Caller concerned that exposure to COVID-19 occurred BUT [2] does not meet COVID-19 EXPOSURE criteria from CDC    Additional Information  • Negative: COVID-19 lab test positive  • Negative: [1] Lives with someone known to have influenza (flu test positive) AND [2] flu-like symptoms (e.g., cough, runny nose, sore throat, SOB; with or without fever)  • Negative: [1] Symptoms of COVID-19 (e.g., cough, fever, SOB, or others) AND [2] HCP diagnosed COVID-19 based on symptoms  • Negative: [1] Symptoms of COVID-19 (e.g., cough, fever, SOB, or others) AND [2] lives in an area with community spread  • Negative: [1] Symptoms of COVID-19 (e.g., cough, fever, SOB, or others) AND [2] within 14 days of EXPOSURE (close contact) with diagnosed or suspected COVID-19 patient  • Negative: [1] Symptoms of COVID-19 (e.g., cough, fever, SOB, or others) AND [2] within 14 days of travel from high-risk area for COVID-19 community spread (identified by CDC)  • Negative: [1] Difficulty breathing (shortness of breath) occurs AND [2] onset > 14 days after COVID-19 EXPOSURE (Close Contact)  • Negative: [1] Dry cough occurs AND [2] onset > 14 days after COVID-19 EXPOSURE  • Negative: [1] Wet cough (i.e., white-yellow, yellow, green, or marcin colored sputum) AND [2] onset > 14 days after COVID-19 EXPOSURE  • Negative: [1] Common cold symptoms AND [2] onset > 14 days after COVID-19 EXPOSURE  •  "Negative: [1] COVID-19 vaccine series completed (fully vaccinated) AND [2] COVID-19 EXPOSURE AND [3] no symptoms  • Negative: COVID-19 vaccine reaction suspected (e.g., fever, headache, muscle aches) occurring during days 1-3 after getting vaccine  • Negative: COVID-19 vaccine, questions about  • Negative: [1] CLOSE CONTACT COVID-19 EXPOSURE within last 14 days AND [2] needs COVID-19 lab test to return to work AND [3] NO symptoms  • Negative: [1] CLOSE CONTACT COVID-19 EXPOSURE within last 14 days AND [2] exposed person is a  (e.g., police or paramedic) AND [3] NO symptoms  • Negative: [1] CLOSE CONTACT COVID-19 EXPOSURE within last 14 days AND [2] exposed person is a healthcare worker who was NOT using all recommended personal protective equipment (e.g., a respirator-N95 mask, eye protection, gloves, and gown) AND [3] NO symptoms  • Negative: [1] Living or working in a correctional facility, long-term care facility, or shelter (i.e., congregate setting; densely populated) AND [2] where an outbreak has occurred AND [3] NO symptoms  • Negative: [1] CLOSE CONTACT COVID-19 EXPOSURE within last 14 days AND [2] NO symptoms  • Negative: [1] COVID-19 EXPOSURE AND [2] 15 or more days ago AND [3] NO symptoms  • Negative: [1] Living in area with community spread (identified by local PHD) BUT [2] NO symptoms  • Negative: [1] Travel from area with community spread (identified by CDC) AND [2] within last 14 days BUT [3] NO symptoms  • Negative: [1] No COVID-19 EXPOSURE BUT [2] living with someone who was exposed and who has no symptoms of COVID-19  • Negative: COVID-19 Testing, questions about  • Negative: COVID-19 Prevention and Healthy Living, questions about  • Negative: COVID -19 Disease, questions about    Answer Assessment - Initial Assessment Questions  1. COVID-19 CLOSE CONTACT: \"Who is the person with the confirmed or suspected COVID-19 infection that you were exposed to?\"      See note  2. PLACE of " "CONTACT: \"Where were you when you were exposed to COVID-19?\" (e.g., home, school, medical waiting room; which city?)      n/a  3. TYPE of CONTACT: \"How much contact was there?\" (e.g., sitting next to, live in same house, work in same office, same building)      n/a  4. DURATION of CONTACT: \"How long were you in contact with the COVID-19 patient?\" (e.g., a few seconds, passed by person, a few minutes, 15 minutes or longer, live with the patient)      n/a  5. MASK: \"Were you wearing a mask?\" \"Was the other person wearing a mask?\" Note: wearing a mask reduces the risk of an otherwise close contact.      n/a  6. DATE of CONTACT: \"When did you have contact with a COVID-19 patient?\" (e.g., how many days ago)      n/a  7. COMMUNITY SPREAD: \"Are there lots of cases of COVID-19 (community spread) where you live?\" (See public health department website, if unsure)        n/a  8. SYMPTOMS: \"Do you have any symptoms?\" (e.g., fever, cough, breathing difficulty, loss of taste or smell)      n/a  9. PREGNANCY OR POSTPARTUM: \"Is there any chance you are pregnant?\" \"When was your last menstrual period?\" \"Did you deliver in the last 2 weeks?\"      n/a  10. HIGH RISK: \"Do you have any heart or lung problems?\" \"Do you have a weak immune system?\" (e.g., heart failure, COPD, asthma, HIV positive, chemotherapy, renal failure, diabetes mellitus, sickle cell anemia, obesity)        n/a  11. TRAVEL: \"Have you traveled out of the country recently?\" If Yes, ask: \"When and where?\" Also ask about out-of-state travel, since the [facility] has identified some high-risk cities for community spread in the . Note: Travel becomes less relevant if there is widespread community transmission where the patient lives.        N/a    Protocols used: CORONAVIRUS (COVID-19) EXPOSURE-ADULT-AH      "

## 2024-04-12 ENCOUNTER — HOSPITAL ENCOUNTER (EMERGENCY)
Facility: HOSPITAL | Age: 39
Discharge: HOME | End: 2024-04-13
Attending: EMERGENCY MEDICINE
Payer: COMMERCIAL

## 2024-04-12 DIAGNOSIS — G43.709 CHRONIC MIGRAINE WITHOUT AURA WITHOUT STATUS MIGRAINOSUS, NOT INTRACTABLE: ICD-10-CM

## 2024-04-12 DIAGNOSIS — I10 PRIMARY HYPERTENSION: Primary | ICD-10-CM

## 2024-04-12 LAB
ANION GAP SERPL CALCULATED.3IONS-SCNC: 10 MMOL/L (ref 5–15)
BASOPHILS # BLD AUTO: 0.05 10*3/MM3 (ref 0–0.2)
BASOPHILS NFR BLD AUTO: 0.4 % (ref 0–1.5)
BUN SERPL-MCNC: 11 MG/DL (ref 6–20)
BUN/CREAT SERPL: 13.1 (ref 7–25)
CALCIUM SPEC-SCNC: 9 MG/DL (ref 8.6–10.5)
CHLORIDE SERPL-SCNC: 107 MMOL/L (ref 98–107)
CO2 SERPL-SCNC: 20 MMOL/L (ref 22–29)
CREAT SERPL-MCNC: 0.84 MG/DL (ref 0.57–1)
DEPRECATED RDW RBC AUTO: 42.3 FL (ref 37–54)
EGFRCR SERPLBLD CKD-EPI 2021: 91.9 ML/MIN/1.73
EOSINOPHIL # BLD AUTO: 0.23 10*3/MM3 (ref 0–0.4)
EOSINOPHIL NFR BLD AUTO: 1.7 % (ref 0.3–6.2)
ERYTHROCYTE [DISTWIDTH] IN BLOOD BY AUTOMATED COUNT: 14.6 % (ref 12.3–15.4)
GLUCOSE SERPL-MCNC: 75 MG/DL (ref 65–99)
HCT VFR BLD AUTO: 36.6 % (ref 34–46.6)
HGB BLD-MCNC: 12.5 G/DL (ref 12–15.9)
IMM GRANULOCYTES # BLD AUTO: 0.03 10*3/MM3 (ref 0–0.05)
IMM GRANULOCYTES NFR BLD AUTO: 0.2 % (ref 0–0.5)
LYMPHOCYTES # BLD AUTO: 3.77 10*3/MM3 (ref 0.7–3.1)
LYMPHOCYTES NFR BLD AUTO: 28.1 % (ref 19.6–45.3)
MCH RBC QN AUTO: 27.6 PG (ref 26.6–33)
MCHC RBC AUTO-ENTMCNC: 34.2 G/DL (ref 31.5–35.7)
MCV RBC AUTO: 80.8 FL (ref 79–97)
MONOCYTES # BLD AUTO: 0.87 10*3/MM3 (ref 0.1–0.9)
MONOCYTES NFR BLD AUTO: 6.5 % (ref 5–12)
NEUTROPHILS NFR BLD AUTO: 63.1 % (ref 42.7–76)
NEUTROPHILS NFR BLD AUTO: 8.45 10*3/MM3 (ref 1.7–7)
NRBC BLD AUTO-RTO: 0 /100 WBC (ref 0–0.2)
PLATELET # BLD AUTO: 299 10*3/MM3 (ref 140–450)
PMV BLD AUTO: 9.4 FL (ref 6–12)
POTASSIUM SERPL-SCNC: 3.9 MMOL/L (ref 3.5–5.2)
RBC # BLD AUTO: 4.53 10*6/MM3 (ref 3.77–5.28)
SODIUM SERPL-SCNC: 137 MMOL/L (ref 136–145)
WBC NRBC COR # BLD AUTO: 13.4 10*3/MM3 (ref 3.4–10.8)

## 2024-04-12 PROCEDURE — 25010000002 METOCLOPRAMIDE PER 10 MG: Performed by: EMERGENCY MEDICINE

## 2024-04-12 PROCEDURE — 96375 TX/PRO/DX INJ NEW DRUG ADDON: CPT

## 2024-04-12 PROCEDURE — 25010000002 DIPHENHYDRAMINE PER 50 MG: Performed by: EMERGENCY MEDICINE

## 2024-04-12 PROCEDURE — 93010 ELECTROCARDIOGRAM REPORT: CPT | Performed by: EMERGENCY MEDICINE

## 2024-04-12 PROCEDURE — 93005 ELECTROCARDIOGRAM TRACING: CPT | Performed by: EMERGENCY MEDICINE

## 2024-04-12 PROCEDURE — 25010000002 DEXAMETHASONE PER 1 MG: Performed by: EMERGENCY MEDICINE

## 2024-04-12 PROCEDURE — 85025 COMPLETE CBC W/AUTO DIFF WBC: CPT | Performed by: EMERGENCY MEDICINE

## 2024-04-12 PROCEDURE — 96374 THER/PROPH/DIAG INJ IV PUSH: CPT

## 2024-04-12 PROCEDURE — 25810000003 SODIUM CHLORIDE 0.9 % SOLUTION: Performed by: EMERGENCY MEDICINE

## 2024-04-12 PROCEDURE — 80048 BASIC METABOLIC PNL TOTAL CA: CPT | Performed by: EMERGENCY MEDICINE

## 2024-04-12 PROCEDURE — 99283 EMERGENCY DEPT VISIT LOW MDM: CPT

## 2024-04-12 RX ORDER — AMLODIPINE BESYLATE 5 MG/1
5 TABLET ORAL DAILY
Qty: 20 TABLET | Refills: 0 | Status: SHIPPED | OUTPATIENT
Start: 2024-04-12 | End: 2024-05-02

## 2024-04-12 RX ORDER — SODIUM CHLORIDE 9 MG/ML
125 INJECTION, SOLUTION INTRAVENOUS CONTINUOUS
Status: DISCONTINUED | OUTPATIENT
Start: 2024-04-12 | End: 2024-04-13 | Stop reason: HOSPADM

## 2024-04-12 RX ORDER — METOCLOPRAMIDE HYDROCHLORIDE 5 MG/ML
10 INJECTION INTRAMUSCULAR; INTRAVENOUS ONCE
Status: COMPLETED | OUTPATIENT
Start: 2024-04-12 | End: 2024-04-12

## 2024-04-12 RX ORDER — NIFEDIPINE 10 MG/1
10 CAPSULE ORAL ONCE
Status: COMPLETED | OUTPATIENT
Start: 2024-04-12 | End: 2024-04-12

## 2024-04-12 RX ORDER — DIPHENHYDRAMINE HYDROCHLORIDE 50 MG/ML
25 INJECTION INTRAMUSCULAR; INTRAVENOUS ONCE
Status: COMPLETED | OUTPATIENT
Start: 2024-04-12 | End: 2024-04-12

## 2024-04-12 RX ORDER — SODIUM CHLORIDE 0.9 % (FLUSH) 0.9 %
10 SYRINGE (ML) INJECTION AS NEEDED
Status: DISCONTINUED | OUTPATIENT
Start: 2024-04-12 | End: 2024-04-13 | Stop reason: HOSPADM

## 2024-04-12 RX ORDER — DEXAMETHASONE SODIUM PHOSPHATE 10 MG/ML
10 INJECTION INTRAMUSCULAR; INTRAVENOUS ONCE
Status: COMPLETED | OUTPATIENT
Start: 2024-04-12 | End: 2024-04-12

## 2024-04-12 RX ADMIN — DEXAMETHASONE SODIUM PHOSPHATE 10 MG: 10 INJECTION INTRAMUSCULAR; INTRAVENOUS at 23:15

## 2024-04-12 RX ADMIN — NIFEDIPINE 10 MG: 10 CAPSULE ORAL at 23:15

## 2024-04-12 RX ADMIN — METOCLOPRAMIDE HYDROCHLORIDE 10 MG: 5 INJECTION INTRAMUSCULAR; INTRAVENOUS at 23:15

## 2024-04-12 RX ADMIN — DIPHENHYDRAMINE HYDROCHLORIDE 25 MG: 50 INJECTION, SOLUTION INTRAMUSCULAR; INTRAVENOUS at 23:15

## 2024-04-12 RX ADMIN — SODIUM CHLORIDE 250 ML: 9 INJECTION, SOLUTION INTRAVENOUS at 23:14

## 2024-04-13 VITALS
BODY MASS INDEX: 31.41 KG/M2 | OXYGEN SATURATION: 97 % | HEIGHT: 60 IN | SYSTOLIC BLOOD PRESSURE: 108 MMHG | RESPIRATION RATE: 16 BRPM | TEMPERATURE: 98 F | DIASTOLIC BLOOD PRESSURE: 68 MMHG | WEIGHT: 160 LBS | HEART RATE: 85 BPM

## 2024-04-13 NOTE — ED PROVIDER NOTES
Subjective   History of Present Illness  Patient is a 38 years old who came to the ER complaining of a head high blood pressure.  Whenever her pressure is high if she starts having some headaches also since she has got history chronic migraine headache so she is having a slight headache which is no worse in intensity and severity than her usual headaches is not a thunderclap headache.  She did not want a CT scan to be performed for this.  Denies any chest pain or shortness of breath.    Hypertension  Severity:  Moderate  Onset quality:  Gradual  Timing:  Constant  Chronicity:  Recurrent  Context: normal sodium, not caffeine, not medication change, not noncompliance, not OTC medications used and not stress    Relieved by:  Nothing  Worsened by:  Nothing  Associated symptoms: no abdominal pain, no anxiety, no blurred vision, no chest pain, no confusion, no dizziness, no ear pain, no fever, no headaches, no hematuria, no hypokalemia, no loss of consciousness, no nausea, no neck pain, no peripheral edema, no shortness of breath, no syncope and not vomiting    Risk factors: family hx of HTN    Risk factors: no alcohol use, no cardiac disease, no cocaine use, no decongestant use, no kidney disease, no obesity, no prior aneurysm, no prior stroke and no PVD        Review of Systems   Constitutional: Negative.  Negative for fever.   HENT: Negative.  Negative for ear pain.    Eyes: Negative.  Negative for blurred vision.   Respiratory: Negative.  Negative for shortness of breath.    Cardiovascular: Negative.  Negative for chest pain and syncope.   Gastrointestinal: Negative.  Negative for abdominal pain, nausea and vomiting.   Genitourinary:  Negative for hematuria.   Musculoskeletal: Negative.  Negative for back pain and neck pain.   Skin: Negative.    Neurological: Negative.  Negative for dizziness, loss of consciousness and headaches.   Psychiatric/Behavioral:  Negative for confusion. The patient is not nervous/anxious.     All other systems reviewed and are negative.      Past Medical History:   Diagnosis Date    Hypertension        Allergies   Allergen Reactions    Amoxicillin Hives       Past Surgical History:   Procedure Laterality Date     SECTION         Family History   Problem Relation Age of Onset    No Known Problems Mother     Cancer Father        Social History     Socioeconomic History    Marital status: Single   Tobacco Use    Smoking status: Former     Current packs/day: 0.25     Types: Cigarettes    Smokeless tobacco: Never   Substance and Sexual Activity    Alcohol use: Yes     Comment: one glass    Drug use: Never           Objective   Physical Exam  Vitals and nursing note reviewed. Exam conducted with a chaperone present.   Constitutional:       General: She is awake. She is not in acute distress.     Appearance: Normal appearance. She is well-developed. She is not toxic-appearing or diaphoretic.   HENT:      Head: Normocephalic and atraumatic.      Mouth/Throat:      Mouth: Mucous membranes are moist.      Pharynx: Oropharynx is clear.   Eyes:      General: Lids are normal. Lids are everted, no foreign bodies appreciated. No visual field deficit.     Pupils: Pupils are equal, round, and reactive to light.   Neck:      Thyroid: No thyromegaly.      Vascular: Normal carotid pulses. No carotid bruit or JVD.      Trachea: Trachea and phonation normal. No tracheal tenderness or tracheal deviation.      Meningeal: Brudzinski's sign and Kernig's sign absent.   Cardiovascular:      Rate and Rhythm: Normal rate and regular rhythm.      Pulses: Normal pulses.      Heart sounds: Normal heart sounds.   Pulmonary:      Effort: Pulmonary effort is normal. No tachypnea or respiratory distress.      Breath sounds: Normal breath sounds. No stridor.   Abdominal:      General: Abdomen is flat. Bowel sounds are normal. There is no distension.      Palpations: Abdomen is soft. There is no mass.      Tenderness: There is no  abdominal tenderness. There is no guarding.   Musculoskeletal:         General: Normal range of motion.      Cervical back: Full passive range of motion without pain, normal range of motion and neck supple. No rigidity.   Skin:     General: Skin is warm and dry.      Capillary Refill: Capillary refill takes less than 2 seconds.      Coloration: Skin is not pale.      Nails: There is no clubbing.   Neurological:      General: No focal deficit present.      Mental Status: She is alert and oriented to person, place, and time. Mental status is at baseline. She is not disoriented.      GCS: GCS eye subscore is 4. GCS verbal subscore is 5. GCS motor subscore is 6.      Cranial Nerves: Cranial nerves 2-12 are intact. No cranial nerve deficit, dysarthria or facial asymmetry.      Sensory: Sensation is intact. No sensory deficit.      Motor: Motor function is intact. No weakness, atrophy or abnormal muscle tone.      Coordination: Coordination is intact. Romberg sign negative. Coordination normal. Finger-Nose-Finger Test normal.      Deep Tendon Reflexes: Reflexes are normal and symmetric. Reflexes normal. Babinski sign absent on the right side. Babinski sign absent on the left side.      Reflex Scores:       Bicep reflexes are 2+ on the right side and 2+ on the left side.       Patellar reflexes are 2+ on the right side and 2+ on the left side.  Psychiatric:         Behavior: Behavior is cooperative.         Procedures           ED Course  ED Course as of 04/12/24 5415   Fri Apr 12, 2024 2329 nsr [TS]   2353 Patient's headache has completely.  And her blood pressure is better.  I have discussed this case with the patient she is ready go home will discharge home with a follow-up with her primary MD and to return there for any worsening symptoms. [TS]   4216 Patient is sure that she is not pregnant [TS]      ED Course User Index  [TS] Otto Roberson MD                                             Medical Decision  Making  Patient with hypertension will give medication for that some lab workup and treat her headache which is a chronic migraine headache with some exacerbation    Problems Addressed:  Chronic migraine without aura without status migrainosus, not intractable: chronic illness or injury     Details: Pain is gone.  Patient is feeling better neurological examination negative.  Primary hypertension: chronic illness or injury with exacerbation, progression, or side effects of treatment     Details: Pressure better controlled wants to go home.    Amount and/or Complexity of Data Reviewed  Labs: ordered.     Details: Labs reviewed  ECG/medicine tests: ordered.    Risk  Prescription drug management.  Risk Details: This patient presented with gradual onset of headache patient arrived hemodynamically stable and neurological exam was without any focal deficits. Patient was placed on a monitor and IV access established. Presentation not consistent with other acute emergent cause of headache at this time. No red flags for subarachnoid hemorrhage and headache is not the worst headache of the patient's life. No neck stiffness or overlying skin changes. Low suspicion for acute angle-closure glaucoma at this time given lack of pupillary findings. Low suspicion for temporal arteritis as there is no bulging temporal artery, pain is not localized to temporal area, and the patient does not have any visual loss or history of vasculitis. Low suspicion for CRAO/CRVO as the patient did not have any painless visual loss. Low suspicion for ACS as the patient does not have any associated chest pain or shortness of breath and has a nonsuspicious history of present illness. No gait disturbance no diplopia no dysarthria or dysphagia on exam. There is low suspicion for meningitis encephalitis as there is no fever no nuchal rigidity and no confusion and negative inflammatory markers and normal lab work-up.  Based on the patient's history and  physical there is very low clinical suspicion for significant intracranial pathology. The headache was NOT sudden onset, NOT maximal at onset, there are NO neurologic findings, the patient does NOT have a fever, the patient does NOT have any jaw claudication, the patient does NOT endorse a clotting disorder, patient DENIES any trauma or eye pain and the headache is NOT associated with dizziness or ataxia.         Final diagnoses:   Primary hypertension   Chronic migraine without aura without status migrainosus, not intractable       ED Disposition  ED Disposition       ED Disposition   Discharge    Condition   Stable    Comment   --               Omar Muhammad MD  [address] [place] [address] [address]  [place] [address]  [phone]               Medication List        New Prescriptions      amLODIPine 5 MG tablet  Commonly known as: NORVASC  Take 1 tablet by mouth Daily for 20 days.               Where to Get Your Medications        These medications were sent to [facility] [place] - [address] - [phone] PH - [phone] FX  [address] [place] [address]      Phone: [phone]   amLODIPine 5 MG tablet            Otto Roberson MD  04/12/24 2306       Otto Roberson MD  04/12/24 0466

## 2024-04-14 LAB
QT INTERVAL: 382 MS
QTC INTERVAL: 390 MS

## 2024-06-10 ENCOUNTER — OFFICE VISIT (OUTPATIENT)
Dept: INTERNAL MEDICINE | Facility: CLINIC | Age: 39
End: 2024-06-10
Payer: COMMERCIAL

## 2024-06-10 ENCOUNTER — PATIENT ROUNDING (BHMG ONLY) (OUTPATIENT)
Dept: INTERNAL MEDICINE | Facility: CLINIC | Age: 39
End: 2024-06-10
Payer: COMMERCIAL

## 2024-06-10 VITALS
HEART RATE: 79 BPM | DIASTOLIC BLOOD PRESSURE: 98 MMHG | OXYGEN SATURATION: 98 % | WEIGHT: 173 LBS | RESPIRATION RATE: 16 BRPM | SYSTOLIC BLOOD PRESSURE: 140 MMHG | BODY MASS INDEX: 33.96 KG/M2 | HEIGHT: 60 IN

## 2024-06-10 DIAGNOSIS — Z23 NEED FOR VACCINATION: ICD-10-CM

## 2024-06-10 DIAGNOSIS — E66.09 CLASS 1 OBESITY DUE TO EXCESS CALORIES WITH SERIOUS COMORBIDITY AND BODY MASS INDEX (BMI) OF 33.0 TO 33.9 IN ADULT: ICD-10-CM

## 2024-06-10 DIAGNOSIS — Z00.01 ANNUAL VISIT FOR GENERAL ADULT MEDICAL EXAMINATION WITH ABNORMAL FINDINGS: ICD-10-CM

## 2024-06-10 DIAGNOSIS — I10 PRIMARY HYPERTENSION: Primary | ICD-10-CM

## 2024-06-10 PROBLEM — R11.2 NAUSEA, VOMITING, AND DIARRHEA: Status: RESOLVED | Noted: 2021-07-11 | Resolved: 2024-06-10

## 2024-06-10 PROBLEM — J12.82 PNEUMONIA DUE TO COVID-19 VIRUS: Status: RESOLVED | Noted: 2021-07-11 | Resolved: 2024-06-10

## 2024-06-10 PROBLEM — D89.831 CYTOKINE RELEASE SYNDROME, GRADE 1: Status: RESOLVED | Noted: 2021-07-15 | Resolved: 2024-06-10

## 2024-06-10 PROBLEM — A41.89 VIRAL SEPSIS: Status: RESOLVED | Noted: 2021-07-11 | Resolved: 2024-06-10

## 2024-06-10 PROBLEM — R19.7 NAUSEA, VOMITING, AND DIARRHEA: Status: RESOLVED | Noted: 2021-07-11 | Resolved: 2024-06-10

## 2024-06-10 PROBLEM — E87.1 HYPONATREMIA: Status: RESOLVED | Noted: 2021-07-12 | Resolved: 2024-06-10

## 2024-06-10 PROBLEM — B97.89 VIRAL SEPSIS: Status: RESOLVED | Noted: 2021-07-11 | Resolved: 2024-06-10

## 2024-06-10 PROBLEM — E87.6 HYPOKALEMIA: Status: RESOLVED | Noted: 2021-07-12 | Resolved: 2024-06-10

## 2024-06-10 PROBLEM — E66.811 CLASS 1 OBESITY DUE TO EXCESS CALORIES WITH SERIOUS COMORBIDITY AND BODY MASS INDEX (BMI) OF 33.0 TO 33.9 IN ADULT: Status: ACTIVE | Noted: 2021-07-12

## 2024-06-10 PROBLEM — U07.1 PNEUMONIA DUE TO COVID-19 VIRUS: Status: RESOLVED | Noted: 2021-07-11 | Resolved: 2024-06-10

## 2024-06-10 PROCEDURE — 99214 OFFICE O/P EST MOD 30 MIN: CPT | Performed by: INTERNAL MEDICINE

## 2024-06-10 PROCEDURE — 90471 IMMUNIZATION ADMIN: CPT | Performed by: INTERNAL MEDICINE

## 2024-06-10 PROCEDURE — 90714 TD VACC NO PRESV 7 YRS+ IM: CPT | Performed by: INTERNAL MEDICINE

## 2024-06-10 RX ORDER — ATENOLOL 25 MG/1
25 TABLET ORAL DAILY
COMMUNITY
Start: 2024-05-22

## 2024-06-10 RX ORDER — AMLODIPINE BESYLATE 5 MG/1
5 TABLET ORAL DAILY
Qty: 90 TABLET | Refills: 1 | Status: SHIPPED | OUTPATIENT
Start: 2024-06-10

## 2024-06-10 NOTE — PROGRESS NOTES
CC: establish care for hypertension    History:  Elena Glaser is a 38 y.o. female who presents today for evaluation of the above problems.        History of Present Illness  The patient recounts an episode of elevated blood pressure last month, which necessitated an emergency room visit. Despite being prescribed amlodipine, she discontinued its use after consuming three doses due to associated urinary frequency. Prior to this, she was also on atenolol. Currently, her diastolic blood pressure is consistently in the high 80s and 90s, occasionally reaching the 100s. She also reports experiencing migraines during episodes of elevated blood pressure and queries about the potential benefits of Tylenol or ibuprofen for headache management. The headaches are typically localized to the posterior aspect of her head, predominantly on the left side. She recalls an episode in 2024 during which she experienced stress that ld to elevated BP. . She monitors her blood pressure at home.    BP values in the ER were:  164/109, 160/102, 160/123  ROS:  Review of Systems   Constitutional:  Negative for chills and fever.   Respiratory:  Negative for cough and shortness of breath.    Cardiovascular:  Negative for chest pain and palpitations.   Gastrointestinal:  Negative for abdominal pain and constipation.   Genitourinary:  Negative for difficulty urinating and dysuria.   Neurological:  Positive for headaches.       Allergies   Allergen Reactions    Amoxicillin Hives     Past Medical History:   Diagnosis Date    Hypertension      Past Surgical History:   Procedure Laterality Date     SECTION       Family History   Problem Relation Age of Onset    Hypertension Mother     Cancer Father     Liver cancer Father     Hypertension Sister       reports that she has quit smoking. Her smoking use included cigarettes. She has been exposed to tobacco smoke. She has never used smokeless tobacco. She reports current alcohol use. She reports  "that she does not use drugs.      Current Outpatient Medications:     atenolol (TENORMIN) 25 MG tablet, Take 1 tablet by mouth Daily., Disp: , Rfl:     levonorgestrel-ethinyl estradiol (Lessina) 0.1-20 MG-MCG per tablet, Take 1 tablet by mouth Daily., Disp: , Rfl:     promethazine (PHENERGAN) 25 MG tablet, Take 1 tablet by mouth Every 6 (Six) Hours As Needed for Nausea or Vomiting., Disp: 15 tablet, Rfl: 0    OBJECTIVE:  /98 (BP Location: Left arm, Patient Position: Sitting, Cuff Size: Adult)   Pulse 79   Resp 16   Ht 152.4 cm (60\")   Wt 78.5 kg (173 lb)   LMP  (LMP Unknown)   SpO2 98%   BMI 33.79 kg/m²    Physical Exam  Constitutional:       General: She is not in acute distress.  Cardiovascular:      Rate and Rhythm: Normal rate and regular rhythm.      Heart sounds: Normal heart sounds. No murmur heard.  Pulmonary:      Effort: Pulmonary effort is normal.      Breath sounds: Normal breath sounds. No wheezing.   Neurological:      Mental Status: She is alert and oriented to person, place, and time.      Gait: Gait normal.   Psychiatric:         Mood and Affect: Mood normal.         Behavior: Behavior normal.           Results      Assessment/Plan    Diagnoses and all orders for this visit:    1. Primary hypertension (Primary)  -     amLODIPine (NORVASC) 5 MG tablet; Take 1 tablet by mouth Daily.  Dispense: 90 tablet; Refill: 1  -     Urinalysis With Culture If Indicated - Urine, Clean Catch; Future  New problem with systemic symptoms of headache.  Poorly controlled, BP goal for age is <140/90 per JNC 8 guidelines, and add amlodipine, which may replace atenolol in the long-run.     2. Class 1 obesity due to excess calories with serious comorbidity and body mass index (BMI) of 33.0 to 33.9 in adult  BMI is >= 30 and <35. (Class 1 Obesity). The following options were offered after discussion;: exercise counseling/recommendations and nutrition counseling/recommendations    3. Annual visit for general " adult medical examination with abnormal findings  -     Comprehensive Metabolic Panel; Future  -     Hemoglobin A1c; Future  -     Lipid Panel; Future  -     Hepatitis C Antibody; Future    4. Need for vaccination  -     Td Vaccine => 8yo PF (TDVAX) 2-2      Assessment & Plan  1. Hypertension.  The patient's blood pressure is notably elevated despite the administration of atenolol. The target blood pressure is set below 140/90. The patient will persist with the atenolol regimen. Amlodipine will be reintroduced into her regimen. A comprehensive blood workup will be conducted to assess liver function, kidney function, glucose levels, cholesterol levels, and urine specimen for protein leakage. The patient was informed that weight loss could significantly impact her blood pressure. Regular exercise was also recommended. A tetanus vaccine will be administered today. Should the patient's blood pressure consistently exceed 140/90 after 1 to 2 weeks, her medication dosage will be adjusted.    2. Headaches related to BP  The patient was informed that ibuprofen or Aleve can elevate blood pressure. Instead, Tylenol was recommended.    Follow-up  The patient is scheduled for a follow-up visit in 6 weeks.      An After Visit Summary was printed and given to the patient at discharge.  Return in about 6 weeks (around 7/22/2024) for Annual physical.       Patient or patient representative verbalized consent for the use of Ambient Listening during the visit with  Jp Del Castillo DO for chart documentation. 6/10/2024  17:10 CDT    Jp Del Castillo D.O. 6/10/2024   Electronically signed.

## 2024-06-10 NOTE — PROGRESS NOTES
Anna 10, 2024    Hello, may I speak with Elena Glaser?    My name is XENIA PALACIOS    I am  with [facility]  [address], [address]  [place] [id number] [phone].    Before we get started may I verify your date of birth? 1985    I am calling to officially welcome you to our practice and ask about your recent visit. Is this a good time to talk? yes    Tell me about your visit with us. What things went well?  EVERYTHING GREAT DO NOT NEED TO CHANGE ANYTHING       We're always looking for ways to make our patients' experiences even better. Do you have recommendations on ways we may improve?  no    Overall were you satisfied with your first visit to our practice? yes       I appreciate you taking the time to speak with me today. Is there anything else I can do for you? no      Thank you, and have a great day.

## 2024-06-14 ENCOUNTER — PATIENT ROUNDING (BHMG ONLY) (OUTPATIENT)
Dept: INTERNAL MEDICINE | Facility: CLINIC | Age: 39
End: 2024-06-14
Payer: COMMERCIAL

## 2024-06-14 NOTE — PROGRESS NOTES
June 14, 2024    Hello, may I speak with Elena Glaser?    My name is MANDEEP ARAIZA      I am  with [facility]  [address], [address]  [place] [address] [phone].    Before we get started may I verify your date of birth? 1985    I am calling to officially welcome you to our practice and ask about your recent visit. Is this a good time to talk? yes    Tell me about your visit with us. What things went well?  EVERYTHING GREAT DO NOT NEED TO CHANGE ANYTHING.       We're always looking for ways to make our patients' experiences even better. Do you have recommendations on ways we may improve?  no    Overall were you satisfied with your first visit to our practice? yes       I appreciate you taking the time to speak with me today. Is there anything else I can do for you? no      Thank you, and have a great day.

## 2024-06-17 ENCOUNTER — LAB (OUTPATIENT)
Dept: LAB | Facility: HOSPITAL | Age: 39
End: 2024-06-17
Payer: COMMERCIAL

## 2024-06-17 DIAGNOSIS — Z00.01 ANNUAL VISIT FOR GENERAL ADULT MEDICAL EXAMINATION WITH ABNORMAL FINDINGS: ICD-10-CM

## 2024-06-17 DIAGNOSIS — I10 PRIMARY HYPERTENSION: ICD-10-CM

## 2024-06-17 LAB
ALBUMIN SERPL-MCNC: 4.3 G/DL (ref 3.5–5.2)
ALBUMIN/GLOB SERPL: 1.1 G/DL
ALP SERPL-CCNC: 63 U/L (ref 39–117)
ALT SERPL W P-5'-P-CCNC: 11 U/L (ref 1–33)
ANION GAP SERPL CALCULATED.3IONS-SCNC: 10 MMOL/L (ref 5–15)
AST SERPL-CCNC: 15 U/L (ref 1–32)
BACTERIA UR QL AUTO: ABNORMAL /HPF
BILIRUB SERPL-MCNC: 0.4 MG/DL (ref 0–1.2)
BILIRUB UR QL STRIP: NEGATIVE
BUN SERPL-MCNC: 13 MG/DL (ref 6–20)
BUN/CREAT SERPL: 13.4 (ref 7–25)
CALCIUM SPEC-SCNC: 9.4 MG/DL (ref 8.6–10.5)
CHLORIDE SERPL-SCNC: 106 MMOL/L (ref 98–107)
CHOLEST SERPL-MCNC: 148 MG/DL (ref 0–200)
CLARITY UR: CLEAR
CO2 SERPL-SCNC: 24 MMOL/L (ref 22–29)
COLOR UR: YELLOW
CREAT SERPL-MCNC: 0.97 MG/DL (ref 0.57–1)
EGFRCR SERPLBLD CKD-EPI 2021: 77.3 ML/MIN/1.73
GLOBULIN UR ELPH-MCNC: 3.9 GM/DL
GLUCOSE SERPL-MCNC: 84 MG/DL (ref 65–99)
GLUCOSE UR STRIP-MCNC: NEGATIVE MG/DL
HBA1C MFR BLD: 5 % (ref 4.8–5.6)
HCV AB SER QL: NORMAL
HDLC SERPL-MCNC: 36 MG/DL (ref 40–60)
HGB UR QL STRIP.AUTO: ABNORMAL
HYALINE CASTS UR QL AUTO: ABNORMAL /LPF
KETONES UR QL STRIP: NEGATIVE
LDLC SERPL CALC-MCNC: 96 MG/DL (ref 0–100)
LDLC/HDLC SERPL: 2.64 {RATIO}
LEUKOCYTE ESTERASE UR QL STRIP.AUTO: NEGATIVE
NITRITE UR QL STRIP: NEGATIVE
PH UR STRIP.AUTO: 6 [PH] (ref 5–8)
POTASSIUM SERPL-SCNC: 4.1 MMOL/L (ref 3.5–5.2)
PROT SERPL-MCNC: 8.2 G/DL (ref 6–8.5)
PROT UR QL STRIP: NEGATIVE
RBC # UR STRIP: ABNORMAL /HPF
REF LAB TEST METHOD: ABNORMAL
SODIUM SERPL-SCNC: 140 MMOL/L (ref 136–145)
SP GR UR STRIP: 1.02 (ref 1–1.03)
SQUAMOUS #/AREA URNS HPF: ABNORMAL /HPF
TRIGL SERPL-MCNC: 85 MG/DL (ref 0–150)
UROBILINOGEN UR QL STRIP: ABNORMAL
VLDLC SERPL-MCNC: 16 MG/DL (ref 5–40)
WBC # UR STRIP: ABNORMAL /HPF

## 2024-06-17 PROCEDURE — 36415 COLL VENOUS BLD VENIPUNCTURE: CPT

## 2024-06-17 PROCEDURE — 83036 HEMOGLOBIN GLYCOSYLATED A1C: CPT

## 2024-06-17 PROCEDURE — 81001 URINALYSIS AUTO W/SCOPE: CPT

## 2024-06-17 PROCEDURE — 87086 URINE CULTURE/COLONY COUNT: CPT

## 2024-06-17 PROCEDURE — 80061 LIPID PANEL: CPT

## 2024-06-17 PROCEDURE — 80053 COMPREHEN METABOLIC PANEL: CPT

## 2024-06-17 PROCEDURE — 86803 HEPATITIS C AB TEST: CPT

## 2024-06-18 LAB — BACTERIA SPEC AEROBE CULT: NO GROWTH

## 2024-07-22 ENCOUNTER — OFFICE VISIT (OUTPATIENT)
Dept: INTERNAL MEDICINE | Facility: CLINIC | Age: 39
End: 2024-07-22
Payer: COMMERCIAL

## 2024-07-22 VITALS
RESPIRATION RATE: 16 BRPM | SYSTOLIC BLOOD PRESSURE: 127 MMHG | HEIGHT: 60 IN | HEART RATE: 58 BPM | DIASTOLIC BLOOD PRESSURE: 84 MMHG | WEIGHT: 172.4 LBS | BODY MASS INDEX: 33.85 KG/M2 | OXYGEN SATURATION: 98 %

## 2024-07-22 DIAGNOSIS — I10 PRIMARY HYPERTENSION: ICD-10-CM

## 2024-07-22 DIAGNOSIS — Z00.01 ANNUAL VISIT FOR GENERAL ADULT MEDICAL EXAMINATION WITH ABNORMAL FINDINGS: Primary | ICD-10-CM

## 2024-07-22 DIAGNOSIS — E66.09 CLASS 1 OBESITY DUE TO EXCESS CALORIES WITH SERIOUS COMORBIDITY AND BODY MASS INDEX (BMI) OF 33.0 TO 33.9 IN ADULT: ICD-10-CM

## 2024-07-22 PROBLEM — R74.01 TRANSAMINITIS: Status: RESOLVED | Noted: 2021-07-14 | Resolved: 2024-07-22

## 2024-07-22 PROCEDURE — 99395 PREV VISIT EST AGE 18-39: CPT | Performed by: INTERNAL MEDICINE

## 2024-07-22 RX ORDER — AMLODIPINE BESYLATE 5 MG/1
5 TABLET ORAL DAILY
Qty: 90 TABLET | Refills: 3 | Status: SHIPPED | OUTPATIENT
Start: 2024-07-22

## 2024-07-22 NOTE — PROGRESS NOTES
CC: f/u preventive health AND hypertension    History:  Elena Glaser is a 38 y.o. female who presents today for evaluation of the above problems.        History of Present Illness  The patient presents for routine follow-up.    She has been monitoring her blood pressure at home and has observed a significant improvement. She has been tolerating her blood pressure medication well and reports no side effects. She has not experienced any headaches. Previously, she was on atenolol. She denies experiencing chest pain, heart palpitations, coughing, or wheezing. Her bowel movements are regular. She has not experienced any weakness, dizziness, or falls.    ROS:  Review of Systems   Constitutional:  Negative for chills and fever.   HENT:  Negative for congestion and sore throat.    Eyes:  Negative for visual disturbance.   Respiratory:  Negative for cough and shortness of breath.    Cardiovascular:  Negative for chest pain and palpitations.   Gastrointestinal:  Negative for abdominal pain, constipation and nausea.   Endocrine: Negative for cold intolerance and heat intolerance.   Genitourinary:  Negative for difficulty urinating and frequency.   Musculoskeletal:  Negative for arthralgias and back pain.   Skin:  Negative for rash.   Neurological:  Negative for dizziness and headaches.   Psychiatric/Behavioral:  Negative for dysphoric mood. The patient is not nervous/anxious.        Allergies   Allergen Reactions    Amoxicillin Hives     Past Medical History:   Diagnosis Date    Hypertension      Past Surgical History:   Procedure Laterality Date     SECTION       Family History   Problem Relation Age of Onset    Hypertension Mother     Cancer Father     Liver cancer Father     Hypertension Sister       reports that she has never smoked. She has been exposed to tobacco smoke. She has never used smokeless tobacco. She reports current alcohol use. She reports that she does not use drugs.      Current Outpatient  "Medications:     amLODIPine (NORVASC) 5 MG tablet, Take 1 tablet by mouth Daily., Disp: 90 tablet, Rfl: 1    levonorgestrel-ethinyl estradiol (Lessina) 0.1-20 MG-MCG per tablet, Take 1 tablet by mouth Daily., Disp: , Rfl:   Atenolol 25mg PO daily    OBJECTIVE:  /84 (BP Location: Left arm, Patient Position: Sitting, Cuff Size: Adult)   Pulse 58   Resp 16   Ht 152.4 cm (60\")   Wt 78.2 kg (172 lb 6.4 oz)   LMP  (LMP Unknown)   SpO2 98%   BMI 33.67 kg/m²    Physical Exam  Constitutional:       General: She is not in acute distress.     Appearance: She is well-developed.   HENT:      Head: Normocephalic and atraumatic.      Right Ear: External ear normal.      Left Ear: External ear normal.   Eyes:      General: No scleral icterus.     Extraocular Movements: Extraocular movements intact.   Neck:      Trachea: No tracheal deviation.   Cardiovascular:      Rate and Rhythm: Normal rate and regular rhythm.      Heart sounds: Normal heart sounds. No murmur heard.  Pulmonary:      Effort: Pulmonary effort is normal. No accessory muscle usage or respiratory distress.      Breath sounds: Normal breath sounds. No wheezing.   Abdominal:      General: There is no distension.      Palpations: Abdomen is soft.      Tenderness: There is no abdominal tenderness.   Musculoskeletal:         General: Normal range of motion.      Cervical back: Normal range of motion and neck supple.      Right lower leg: No edema.      Left lower leg: No edema.   Skin:     General: Skin is warm and dry.      Nails: There is no clubbing.   Neurological:      Mental Status: She is alert and oriented to person, place, and time.      Coordination: Coordination normal.      Gait: Gait normal.   Psychiatric:         Mood and Affect: Mood normal. Mood is not anxious or depressed.         Behavior: Behavior normal.           Results  Laboratory Studies  Liver numbers returned to normal.    Assessment/Plan    Diagnoses and all orders for this " visit:    1. Annual visit for general adult medical examination with abnormal findings (Primary)  Immunizations:      - Tetanus: Received in 2024      - Influenza: Recommend yearly.      - Prevnar: Once after age 65      - Shingrix: Series after 50      - COVID: Consider primary vaccination.   CRC screening: Due at 45  Mammogram:  Due at 40  PAP: was done on approximately 10/2023 and the result was: normal PAP with negative HPV. Repeat 5 years.   DEXA: DEXA scan at 65    2. Primary hypertension  Well controlled, BP goal for age is <140/90 per JNC 8 guidelines, and continue current medications    3. Class 1 obesity due to excess calories with serious comorbidity and body mass index (BMI) of 33.0 to 33.9 in adult   Continue lifestyle modifications.       An After Visit Summary was printed and given to the patient at discharge.  Return in about 1 year (around 7/22/2025) for Annual physical.       Patient or patient representative verbalized consent for the use of Ambient Listening during the visit with  Jp Del Castillo DO for chart documentation. 7/22/2024  17:49 CDT    Jp Del Castillo D.O. 7/22/2024   Electronically signed.

## 2024-09-17 ENCOUNTER — OFFICE VISIT (OUTPATIENT)
Dept: INTERNAL MEDICINE | Facility: CLINIC | Age: 39
End: 2024-09-17
Payer: COMMERCIAL

## 2024-09-17 VITALS
WEIGHT: 166 LBS | SYSTOLIC BLOOD PRESSURE: 144 MMHG | OXYGEN SATURATION: 98 % | HEART RATE: 106 BPM | TEMPERATURE: 98.8 F | HEIGHT: 60 IN | BODY MASS INDEX: 32.59 KG/M2 | DIASTOLIC BLOOD PRESSURE: 96 MMHG

## 2024-09-17 DIAGNOSIS — U07.1 COVID-19: Primary | ICD-10-CM

## 2024-09-17 LAB
EXPIRATION DATE: ABNORMAL
FLUAV AG UPPER RESP QL IA.RAPID: NOT DETECTED
FLUBV AG UPPER RESP QL IA.RAPID: NOT DETECTED
INTERNAL CONTROL: ABNORMAL
Lab: ABNORMAL
SARS-COV-2 AG UPPER RESP QL IA.RAPID: DETECTED

## 2024-09-17 PROCEDURE — 99214 OFFICE O/P EST MOD 30 MIN: CPT

## 2024-09-17 PROCEDURE — 87428 SARSCOV & INF VIR A&B AG IA: CPT

## 2024-09-17 RX ORDER — FLUTICASONE PROPIONATE 50 MCG
2 SPRAY, SUSPENSION (ML) NASAL DAILY
Qty: 15.8 G | Refills: 3 | Status: SHIPPED | OUTPATIENT
Start: 2024-09-17

## 2024-09-17 RX ORDER — CETIRIZINE HYDROCHLORIDE 10 MG/1
10 TABLET ORAL DAILY
Qty: 30 TABLET | Refills: 3 | Status: SHIPPED | OUTPATIENT
Start: 2024-09-17

## 2024-09-17 RX ORDER — GUAIFENESIN 600 MG/1
1200 TABLET, EXTENDED RELEASE ORAL 2 TIMES DAILY
Qty: 40 TABLET | Refills: 1 | Status: SHIPPED | OUTPATIENT
Start: 2024-09-17

## 2025-01-31 ENCOUNTER — OFFICE VISIT (OUTPATIENT)
Dept: INTERNAL MEDICINE | Facility: CLINIC | Age: 40
End: 2025-01-31
Payer: COMMERCIAL

## 2025-01-31 VITALS
OXYGEN SATURATION: 100 % | DIASTOLIC BLOOD PRESSURE: 92 MMHG | BODY MASS INDEX: 32.42 KG/M2 | HEART RATE: 98 BPM | TEMPERATURE: 98.2 F | SYSTOLIC BLOOD PRESSURE: 138 MMHG | HEIGHT: 60 IN

## 2025-01-31 DIAGNOSIS — R50.9 FEVER, UNSPECIFIED FEVER CAUSE: Primary | ICD-10-CM

## 2025-01-31 DIAGNOSIS — J10.1 INFLUENZA A: ICD-10-CM

## 2025-01-31 DIAGNOSIS — R05.1 ACUTE COUGH: ICD-10-CM

## 2025-01-31 LAB
EXPIRATION DATE: ABNORMAL
FLUAV AG UPPER RESP QL IA.RAPID: DETECTED
FLUBV AG UPPER RESP QL IA.RAPID: NOT DETECTED
INTERNAL CONTROL: ABNORMAL
Lab: ABNORMAL
SARS-COV-2 AG UPPER RESP QL IA.RAPID: NOT DETECTED

## 2025-01-31 PROCEDURE — 87428 SARSCOV & INF VIR A&B AG IA: CPT

## 2025-01-31 PROCEDURE — 99214 OFFICE O/P EST MOD 30 MIN: CPT

## 2025-01-31 RX ORDER — OSELTAMIVIR PHOSPHATE 75 MG/1
75 CAPSULE ORAL 2 TIMES DAILY
Qty: 10 CAPSULE | Refills: 0 | Status: SHIPPED | OUTPATIENT
Start: 2025-01-31

## 2025-01-31 NOTE — LETTER
January 31, 2025     Patient: Elena Glaser   YOB: 1985   Date of Visit: 1/31/2025       To Whom It May Concern:    It is my medical opinion that Elena Glaser may return to work in three days.            Sincerely,        GHULAM Stanford    CC: No Recipients

## 2025-01-31 NOTE — PROGRESS NOTES
Chief Complaint   Patient presents with    Cough     Started Tuesday    Fever    Diarrhea    Fatigue       History:      Elena Glaser is a 39 y.o. female who presents today for evaluation of the above problems.      HPI  History of Present Illness  The patient is a 39-year-old female who presents for evaluation of influenza A.    She began experiencing symptoms on Tuesday, initially presenting as a cough. By Wednesday, she developed a fever, which has been intermittently present since then. She also reports a loss of appetite and mild diarrhea but has not experienced any episodes of vomiting. She is seeking a work note due to her illness.    ROS:  Review of Systems   Constitutional:  Positive for fatigue and fever.   HENT:  Positive for congestion.    Respiratory:  Positive for cough. Negative for shortness of breath and wheezing.    Cardiovascular:  Negative for chest pain and palpitations.         Current Outpatient Medications:     amLODIPine (NORVASC) 5 MG tablet, Take 1 tablet by mouth Daily., Disp: 90 tablet, Rfl: 3    levonorgestrel-ethinyl estradiol (Lessina) 0.1-20 MG-MCG per tablet, Take 1 tablet by mouth Daily., Disp: , Rfl:     cetirizine (zyrTEC) 10 MG tablet, Take 1 tablet by mouth Daily. (Patient not taking: Reported on 1/31/2025), Disp: 30 tablet, Rfl: 3    fluticasone (FLONASE) 50 MCG/ACT nasal spray, Administer 2 sprays into the nostril(s) as directed by provider Daily. (Patient not taking: Reported on 1/31/2025), Disp: 15.8 g, Rfl: 3    guaiFENesin (Mucinex) 600 MG 12 hr tablet, Take 2 tablets by mouth 2 (Two) Times a Day. (Patient not taking: Reported on 1/31/2025), Disp: 40 tablet, Rfl: 1    Nirmatrelvir & Ritonavir, 300mg/100mg, (PAXLOVID), Take 3 tablets by mouth 2 (Two) Times a Day., Disp: 30 each, Rfl: 0    oseltamivir (Tamiflu) 75 MG capsule, Take 1 capsule by mouth 2 (Two) Times a Day., Disp: 10 capsule, Rfl: 0    Lab Results   Component Value Date    GLUCOSE 84 06/17/2024    BUN 13  06/17/2024    CREATININE 0.97 06/17/2024     06/17/2024    K 4.1 06/17/2024     06/17/2024    CALCIUM 9.4 06/17/2024    PROTEINTOT 8.2 06/17/2024    ALBUMIN 4.3 06/17/2024    ALT 11 06/17/2024    AST 15 06/17/2024    ALKPHOS 63 06/17/2024    BILITOT 0.4 06/17/2024    GLOB 3.9 06/17/2024    AGRATIO 1.1 06/17/2024    BCR 13.4 06/17/2024    ANIONGAP 10.0 06/17/2024    EGFR 77.3 06/17/2024       WBC   Date Value Ref Range Status   04/12/2024 13.40 (H) 3.40 - 10.80 10*3/mm3 Final     RBC   Date Value Ref Range Status   04/12/2024 4.53 3.77 - 5.28 10*6/mm3 Final     Hemoglobin   Date Value Ref Range Status   04/12/2024 12.5 12.0 - 15.9 g/dL Final     Hematocrit   Date Value Ref Range Status   04/12/2024 36.6 34.0 - 46.6 % Final     MCV   Date Value Ref Range Status   04/12/2024 80.8 79.0 - 97.0 fL Final     MCH   Date Value Ref Range Status   04/12/2024 27.6 26.6 - 33.0 pg Final     MCHC   Date Value Ref Range Status   04/12/2024 34.2 31.5 - 35.7 g/dL Final     RDW   Date Value Ref Range Status   04/12/2024 14.6 12.3 - 15.4 % Final     RDW-SD   Date Value Ref Range Status   04/12/2024 42.3 37.0 - 54.0 fl Final     MPV   Date Value Ref Range Status   04/12/2024 9.4 6.0 - 12.0 fL Final     Platelets   Date Value Ref Range Status   04/12/2024 299 140 - 450 10*3/mm3 Final     Neutrophil %   Date Value Ref Range Status   04/12/2024 63.1 42.7 - 76.0 % Final     Lymphocyte %   Date Value Ref Range Status   04/12/2024 28.1 19.6 - 45.3 % Final     Monocyte %   Date Value Ref Range Status   04/12/2024 6.5 5.0 - 12.0 % Final     Eosinophil %   Date Value Ref Range Status   04/12/2024 1.7 0.3 - 6.2 % Final     Basophil %   Date Value Ref Range Status   04/12/2024 0.4 0.0 - 1.5 % Final     Immature Grans %   Date Value Ref Range Status   04/12/2024 0.2 0.0 - 0.5 % Final     Neutrophils, Absolute   Date Value Ref Range Status   04/12/2024 8.45 (H) 1.70 - 7.00 10*3/mm3 Final     Lymphocytes, Absolute   Date Value Ref Range  "Status   04/12/2024 3.77 (H) 0.70 - 3.10 10*3/mm3 Final     Monocytes, Absolute   Date Value Ref Range Status   04/12/2024 0.87 0.10 - 0.90 10*3/mm3 Final     Eosinophils, Absolute   Date Value Ref Range Status   04/12/2024 0.23 0.00 - 0.40 10*3/mm3 Final     Basophils, Absolute   Date Value Ref Range Status   04/12/2024 0.05 0.00 - 0.20 10*3/mm3 Final     Immature Grans, Absolute   Date Value Ref Range Status   04/12/2024 0.03 0.00 - 0.05 10*3/mm3 Final     nRBC   Date Value Ref Range Status   04/12/2024 0.0 0.0 - 0.2 /100 WBC Final       OBJECTIVE:  Visit Vitals  /92 (BP Location: Left arm, Patient Position: Sitting, Cuff Size: Adult)   Pulse 98   Temp 98.2 °F (36.8 °C) (Oral)   Ht 152.4 cm (60\")   SpO2 100%   BMI 32.42 kg/m²      Physical Exam  Constitutional:       Appearance: Normal appearance.   HENT:      Head: Normocephalic.      Right Ear: Tympanic membrane normal. A middle ear effusion is present. Tympanic membrane is not injected, perforated or erythematous.      Left Ear: Tympanic membrane normal. A middle ear effusion is present. Tympanic membrane is not injected, perforated or erythematous.      Nose: Nose normal.      Mouth/Throat:      Mouth: Mucous membranes are moist.   Cardiovascular:      Rate and Rhythm: Normal rate and regular rhythm.      Pulses: Normal pulses.      Heart sounds: Normal heart sounds.   Pulmonary:      Effort: Pulmonary effort is normal.      Breath sounds: Normal breath sounds.   Abdominal:      General: Bowel sounds are normal.      Palpations: Abdomen is soft.   Musculoskeletal:         General: Normal range of motion.      Cervical back: Normal range of motion.   Skin:     General: Skin is warm and dry.   Neurological:      Mental Status: She is alert and oriented to person, place, and time.   Psychiatric:         Mood and Affect: Mood normal.         Behavior: Behavior normal.         Thought Content: Thought content normal.         Judgment: Judgment normal. "       Physical Exam  There is fluid in the ears.  Lungs sound normal.    Results  Laboratory Studies  Influenza A test positive.    Assessment/Plan    Diagnoses and all orders for this visit:    1. Fever, unspecified fever cause (Primary)  -     POCT SARS-CoV-2 Antigen JARON + Flu    2. Acute cough  -     POCT SARS-CoV-2 Antigen JAORN + Flu    3. Influenza A  -     oseltamivir (Tamiflu) 75 MG capsule; Take 1 capsule by mouth 2 (Two) Times a Day.  Dispense: 10 capsule; Refill: 0      Assessment & Plan  1. Influenza A.  Symptoms include cough, fever, loss of appetite, and mild diarrhea. Fever is intermittent, which is common with viral infections. Blood pressure is slightly elevated, likely due to the current illness. Over-the-counter medications such as Claritin, Zyrtec, and Flonase are recommended for congestion or cough. Tylenol or ibuprofen can be used for fever management. Adequate hydration is advised. A prescription for Tamiflu will be sent to [facility] to shorten the duration and severity of symptoms. A work note will be provided, excusing her from work until Monday. If the fever persists beyond Monday, she should inform us.      Return if symptoms worsen or fail to improve.      GHULAM Stanford  09:40 CST  1/31/2025   Electronically signed      Patient or patient representative verbalized consent for the use of Ambient Listening during the visit with  GHULAM Stanford for chart documentation. 1/31/2025  10:59 CST

## 2025-07-24 ENCOUNTER — OFFICE VISIT (OUTPATIENT)
Dept: INTERNAL MEDICINE | Facility: CLINIC | Age: 40
End: 2025-07-24
Payer: COMMERCIAL

## 2025-07-24 ENCOUNTER — LAB (OUTPATIENT)
Dept: LAB | Facility: HOSPITAL | Age: 40
End: 2025-07-24
Payer: COMMERCIAL

## 2025-07-24 VITALS
RESPIRATION RATE: 16 BRPM | SYSTOLIC BLOOD PRESSURE: 125 MMHG | HEIGHT: 60 IN | WEIGHT: 164 LBS | DIASTOLIC BLOOD PRESSURE: 80 MMHG | BODY MASS INDEX: 32.2 KG/M2 | OXYGEN SATURATION: 100 % | HEART RATE: 83 BPM

## 2025-07-24 DIAGNOSIS — E66.811 CLASS 1 OBESITY DUE TO EXCESS CALORIES WITH SERIOUS COMORBIDITY AND BODY MASS INDEX (BMI) OF 32.0 TO 32.9 IN ADULT: ICD-10-CM

## 2025-07-24 DIAGNOSIS — Z00.01 ANNUAL VISIT FOR GENERAL ADULT MEDICAL EXAMINATION WITH ABNORMAL FINDINGS: Primary | ICD-10-CM

## 2025-07-24 DIAGNOSIS — I10 PRIMARY HYPERTENSION: ICD-10-CM

## 2025-07-24 DIAGNOSIS — Z00.01 ANNUAL VISIT FOR GENERAL ADULT MEDICAL EXAMINATION WITH ABNORMAL FINDINGS: ICD-10-CM

## 2025-07-24 DIAGNOSIS — E66.09 CLASS 1 OBESITY DUE TO EXCESS CALORIES WITH SERIOUS COMORBIDITY AND BODY MASS INDEX (BMI) OF 32.0 TO 32.9 IN ADULT: ICD-10-CM

## 2025-07-24 LAB
ALBUMIN SERPL-MCNC: 4 G/DL (ref 3.5–5.2)
ALBUMIN/GLOB SERPL: 1.2 G/DL
ALP SERPL-CCNC: 54 U/L (ref 39–117)
ALT SERPL W P-5'-P-CCNC: 9 U/L (ref 1–33)
ANION GAP SERPL CALCULATED.3IONS-SCNC: 13 MMOL/L (ref 5–15)
AST SERPL-CCNC: 15 U/L (ref 1–32)
BILIRUB SERPL-MCNC: 0.4 MG/DL (ref 0–1.2)
BUN SERPL-MCNC: 13.8 MG/DL (ref 6–20)
BUN/CREAT SERPL: 16 (ref 7–25)
CALCIUM SPEC-SCNC: 9.1 MG/DL (ref 8.6–10.5)
CHLORIDE SERPL-SCNC: 107 MMOL/L (ref 98–107)
CHOLEST SERPL-MCNC: 145 MG/DL (ref 0–200)
CO2 SERPL-SCNC: 19 MMOL/L (ref 22–29)
CREAT SERPL-MCNC: 0.86 MG/DL (ref 0.57–1)
DEPRECATED RDW RBC AUTO: 40.9 FL (ref 37–54)
EGFRCR SERPLBLD CKD-EPI 2021: 88.8 ML/MIN/1.73
ERYTHROCYTE [DISTWIDTH] IN BLOOD BY AUTOMATED COUNT: 14.3 % (ref 12.3–15.4)
GLOBULIN UR ELPH-MCNC: 3.4 GM/DL
GLUCOSE SERPL-MCNC: 102 MG/DL (ref 65–99)
HBA1C MFR BLD: 4.9 % (ref 4.8–5.6)
HCT VFR BLD AUTO: 37.3 % (ref 34–46.6)
HDLC SERPL-MCNC: 39 MG/DL (ref 40–60)
HGB BLD-MCNC: 13.1 G/DL (ref 12–15.9)
LDLC SERPL CALC-MCNC: 92 MG/DL (ref 0–100)
LDLC/HDLC SERPL: 2.36 {RATIO}
MCH RBC QN AUTO: 27.8 PG (ref 26.6–33)
MCHC RBC AUTO-ENTMCNC: 35.1 G/DL (ref 31.5–35.7)
MCV RBC AUTO: 79.2 FL (ref 79–97)
PLATELET # BLD AUTO: 320 10*3/MM3 (ref 140–450)
PMV BLD AUTO: 9 FL (ref 6–12)
POTASSIUM SERPL-SCNC: 3.9 MMOL/L (ref 3.5–5.2)
PROT SERPL-MCNC: 7.4 G/DL (ref 6–8.5)
RBC # BLD AUTO: 4.71 10*6/MM3 (ref 3.77–5.28)
SODIUM SERPL-SCNC: 139 MMOL/L (ref 136–145)
TRIGL SERPL-MCNC: 70 MG/DL (ref 0–150)
VLDLC SERPL-MCNC: 14 MG/DL (ref 5–40)
WBC NRBC COR # BLD AUTO: 8.58 10*3/MM3 (ref 3.4–10.8)

## 2025-07-24 PROCEDURE — 80061 LIPID PANEL: CPT

## 2025-07-24 PROCEDURE — 99395 PREV VISIT EST AGE 18-39: CPT | Performed by: INTERNAL MEDICINE

## 2025-07-24 PROCEDURE — 36415 COLL VENOUS BLD VENIPUNCTURE: CPT

## 2025-07-24 PROCEDURE — 85027 COMPLETE CBC AUTOMATED: CPT

## 2025-07-24 PROCEDURE — 83036 HEMOGLOBIN GLYCOSYLATED A1C: CPT

## 2025-07-24 PROCEDURE — 80053 COMPREHEN METABOLIC PANEL: CPT

## 2025-07-24 RX ORDER — AMLODIPINE BESYLATE 5 MG/1
5 TABLET ORAL DAILY
Qty: 90 TABLET | Refills: 3 | Status: SHIPPED | OUTPATIENT
Start: 2025-07-24

## 2025-07-24 NOTE — PROGRESS NOTES
CC: f/u preventive health    History:  Elena Glaser is a 39 y.o. female who presents today for evaluation of the above problems.        History of Present Illness  The patient came in for a routine checkup and mentioned that she feels really good overall. She did have some allergy symptoms when the weather changed, but those have gone away since it got hotter. She has also been working on her weight and has successfully reduced it from 172 pounds last year to 164 pounds now.    ROS:  Review of Systems   Constitutional:  Negative for chills and fever.   HENT:  Negative for congestion and sore throat.    Eyes:  Negative for visual disturbance.   Respiratory:  Negative for cough and shortness of breath.    Cardiovascular:  Negative for chest pain and palpitations.   Gastrointestinal:  Negative for abdominal pain, constipation and nausea.   Endocrine: Negative for cold intolerance and heat intolerance.   Genitourinary:  Negative for difficulty urinating and frequency.   Musculoskeletal:  Negative for arthralgias and back pain.   Skin:  Negative for rash.   Neurological:  Negative for dizziness and headaches.   Psychiatric/Behavioral:  Negative for dysphoric mood. The patient is not nervous/anxious.        Allergies   Allergen Reactions    Amoxicillin Hives     Past Medical History:   Diagnosis Date    Hypertension      Past Surgical History:   Procedure Laterality Date     SECTION       Family History   Problem Relation Age of Onset    Hypertension Mother     Cancer Father     Liver cancer Father     Hypertension Sister       reports that she has quit smoking. Her smoking use included cigarettes. She has been exposed to tobacco smoke. She has never used smokeless tobacco. She reports current alcohol use of about 2.0 standard drinks of alcohol per week. She reports that she does not use drugs.      Current Outpatient Medications:     amLODIPine (NORVASC) 5 MG tablet, Take 1 tablet by mouth Daily., Disp: 90 tablet,  "Rfl: 3    cetirizine (zyrTEC) 10 MG tablet, Take 1 tablet by mouth Daily., Disp: 30 tablet, Rfl: 3    fluticasone (FLONASE) 50 MCG/ACT nasal spray, Administer 2 sprays into the nostril(s) as directed by provider Daily., Disp: 15.8 g, Rfl: 3    levonorgestrel-ethinyl estradiol (Lessina) 0.1-20 MG-MCG per tablet, Take 1 tablet by mouth Daily., Disp: , Rfl:     OBJECTIVE:  /80 (BP Location: Left arm, Patient Position: Sitting, Cuff Size: Adult)   Pulse 83   Resp 16   Ht 152.4 cm (60\")   Wt 74.4 kg (164 lb)   LMP  (LMP Unknown)   SpO2 100%   BMI 32.03 kg/m²    Physical Exam  Constitutional:       General: She is not in acute distress.     Appearance: She is well-developed.   HENT:      Head: Normocephalic and atraumatic.      Right Ear: Tympanic membrane and external ear normal.      Left Ear: Tympanic membrane and external ear normal.   Eyes:      General: No scleral icterus.     Extraocular Movements: Extraocular movements intact.   Neck:      Trachea: No tracheal deviation.   Cardiovascular:      Rate and Rhythm: Normal rate and regular rhythm.      Heart sounds: Normal heart sounds. No murmur heard.  Pulmonary:      Effort: Pulmonary effort is normal. No accessory muscle usage or respiratory distress.      Breath sounds: Normal breath sounds. No wheezing.   Abdominal:      General: There is no distension.      Palpations: Abdomen is soft.      Tenderness: There is no abdominal tenderness.   Musculoskeletal:         General: Normal range of motion.      Cervical back: Normal range of motion and neck supple.      Right lower leg: No edema.      Left lower leg: No edema.   Skin:     General: Skin is warm and dry.      Nails: There is no clubbing.   Neurological:      Mental Status: She is alert and oriented to person, place, and time.      Coordination: Coordination normal.      Gait: Gait normal.   Psychiatric:         Mood and Affect: Mood normal. Mood is not anxious or depressed.         Behavior: " Behavior normal.             Assessment/Plan    Diagnoses and all orders for this visit:    1. Annual visit for general adult medical examination with abnormal findings (Primary)  -     Hemoglobin A1c; Future  -     Lipid Panel; Future  Immunizations:      - Tetanus: Received in 2024      - Influenza: Recommend yearly.      - Prevnar: Once after age 50      - Shingrix: Recommend completion of series after 50      - COVID: Consider vaccination.   CRC screening: Due at 45  Mammogram: Due at 40  PAP: was done on approximately 10/2023 and the result was: normal PAP with negative HPV. Repeat 5 years.   DEXA: DEXA scan at 66    2. Primary hypertension  -     amLODIPine (NORVASC) 5 MG tablet; Take 1 tablet by mouth Daily.  Dispense: 90 tablet; Refill: 3  -     Comprehensive Metabolic Panel; Future  -     CBC (No Diff); Future  Well controlled, BP goal for age is <140/90 per JNC 8 guidelines, and continue current medications    3. Class 1 obesity due to excess calories with serious comorbidity and body mass index (BMI) of 32.0 to 32.9 in adult  BMI is >= 30 and <35. (Class 1 Obesity). The following options were offered after discussion;: exercise counseling/recommendations and nutrition counseling/recommendations          An After Visit Summary was printed and given to the patient at discharge.  No follow-ups on file.       Patient or patient representative verbalized consent for the use of Ambient Listening during the visit with  Jp Del Castillo DO for chart documentation. 7/24/2025  09:04 CDT    Jp Del Castillo D.O. 7/24/2025   Electronically signed.